# Patient Record
Sex: MALE | Race: WHITE
[De-identification: names, ages, dates, MRNs, and addresses within clinical notes are randomized per-mention and may not be internally consistent; named-entity substitution may affect disease eponyms.]

---

## 2019-05-21 ENCOUNTER — HOSPITAL ENCOUNTER (INPATIENT)
Dept: HOSPITAL 36 - GERO | Age: 59
LOS: 20 days | Discharge: NURSING, REHAB OR LONG-TERM CARE | DRG: 885 | End: 2019-06-10
Attending: PSYCHIATRY & NEUROLOGY | Admitting: PSYCHIATRY & NEUROLOGY
Payer: MEDICARE

## 2019-05-21 VITALS — DIASTOLIC BLOOD PRESSURE: 76 MMHG | SYSTOLIC BLOOD PRESSURE: 117 MMHG

## 2019-05-21 DIAGNOSIS — N28.9: ICD-10-CM

## 2019-05-21 DIAGNOSIS — E86.0: ICD-10-CM

## 2019-05-21 DIAGNOSIS — E44.0: ICD-10-CM

## 2019-05-21 DIAGNOSIS — Z59.0: ICD-10-CM

## 2019-05-21 DIAGNOSIS — F20.9: Primary | ICD-10-CM

## 2019-05-21 DIAGNOSIS — D64.9: ICD-10-CM

## 2019-05-21 DIAGNOSIS — E46: ICD-10-CM

## 2019-05-21 PROCEDURE — G0410 GRP PSYCH PARTIAL HOSP 45-50: HCPCS

## 2019-05-21 SDOH — ECONOMIC STABILITY - HOUSING INSECURITY: HOMELESSNESS: Z59.0

## 2019-05-22 LAB
CHOLEST SERPL-MCNC: 115 MG/DL (ref ?–200)
HDLC SERPL-MCNC: 36 MG/DL (ref 23–92)
TRIGL SERPL-MCNC: 53 MG/DL (ref ?–150)

## 2019-05-22 RX ADMIN — HALOPERIDOL LACTATE SCH MG: 2 SOLUTION, CONCENTRATE ORAL at 16:27

## 2019-05-23 LAB — HBA1C BLD-MCNC: 6 % (ref 4.8–5.6)

## 2019-05-23 RX ADMIN — HALOPERIDOL LACTATE SCH MG: 2 SOLUTION, CONCENTRATE ORAL at 16:53

## 2019-05-23 RX ADMIN — HALOPERIDOL LACTATE SCH MG: 2 SOLUTION, CONCENTRATE ORAL at 09:07

## 2019-05-23 NOTE — INTERNAL MEDICINE PROG NOTE
Internal Medicine Subjective





- Subjective


Patient seen and examined:: with staff, chart reviewed


Patient is:: awake, verbal, interactive, confused


Per staff patient has:: no adverse event, poor appetite





Internal Medicine Objective





- Results


Recent Labs: 


 Laboratory Last Values











Triglycerides  53 mg/dL (<150)   19  06:10    


 


Cholesterol  115 mg/dL (<200)   19  06:10    


 


LDL Cholesterol Direct  61 mg/dL ()  L  19  06:10    


 


HDL Cholesterol  36 mg/dL (23-92)   19  06:10    














- Physical Exam


Vitals and I&O: 


 Vital Signs











Temp  97.2 F   19 06:34


 


Pulse  64   19 06:34


 


Resp  18   19 06:34


 


BP  136/77   19 06:34


 


Pulse Ox  97   19 06:34








 Intake & Output











 19





 18:59 06:59 18:59


 


Intake Total 650 120 


 


Balance 650 120 


 


Intake:   


 


  Oral 650 120 


 


Other:   


 


  # Voids 2 3 











Active Medications: 


Current Medications





Acetaminophen (Tylenol)  650 mg PO Q4HR PRN


   PRN Reason: Mild Pain / Temp above 100


   Stop: 19 00:31


Al Hydrox/Mg Hydrox/Simethicone (Maalox)  30 ml PO Q4HR PRN


   PRN Reason: GI DISTRESS


   Stop: 19 00:31


Ascorbic Acid (Vitamin C)  500 mg PO DAILY GABY


   Stop: 19 08:59


   Last Admin: 19 09:06 Dose:  500 mg


Haloperidol Lactate (Haldol)  2 mg PO BID GABY; Protocol


   Stop: 19 16:59


   Last Admin: 19 09:07 Dose:  2 mg


Lithium Carbonate (Eskalith)  150 mg PO DAILY GABY; Protocol


   Stop: 19 08:59


   Last Admin: 19 09:06 Dose:  150 mg


Lorazepam (Ativan)  0.5 mg PO Q4HR PRN; Protocol


   PRN Reason: Anxiety


   Stop: 19 00:49


Magnesium Hydroxide (Milk Of Magnesia)  30 ml PO HS PRN


   PRN Reason: Constipation


Multivitamins/Vitamin C (Theragran)  1 tab PO DAILY GBAY


   Stop: 19 08:59


   Last Admin: 19 09:06 Dose:  1 tab


Zolpidem Tartrate (Ambien)  5 mg PO HS PRN


   PRN Reason: Insomnia


   Stop: 19 00:49


   Last Admin: 19 20:54 Dose:  5 mg








General: demented


HEENT: NC/AT, PERRLA, EOMI


Neck: Supple, No JVD


Lungs: CTAB


Cardiovascular: RRR, Normal S1, Normal S2


Abdomen: soft, non-tender, positive bowel sound


Extremities: excoriation


Neurological: no change





Internal Medicine Assmt/Plan





- Assessment


Assessment: 





ASSESSMENT AND PLAN:  Dehydration, malnutrition, anemia, renal insufficiency. 


generalized weakness








- Plan


Plan: 





PLAN:  


We will provide the patient adequate nutritional support.  


will provide hydration, supplements, sypmtomatic care and treatment.  


We will continue to follow.








Nutritional Asmnt/Malnutr-PDOC





- Dietary Evaluation


Malnutrition Findings (Please click <Entered> for more info): 








Nutritional Asmnt/Malnutrition                             Start:  19 14:

01


Text:                                                      Status: Complete    

  


Freq:                                                                          

  


Protocol:                                                                      

  


 Document     19 14:02  LCHENG  (Rec: 19 14:02  LCHENG  [id number])


 Nutritional Asmnt/Malnutrition


     Patient General Information


      Nutritional Screening                      High Risk


                                                 Consult


      Diagnosis                                  psychosis


      Pertinent Medical Hx/Surgical Hx           no H&P as of this time


      Subjective Information                     Consult received for poor


                                                 appetite. But SHRUTHI Rahman


                                                 reported pt ate well, consumed


                                                 75% breakfast this morning.


                                                 Pt requested strawberry Ensure


                                                 .


      Current Diet Order/ Nutrition Support      regular


      Pertinent Medications                      vit C, theragran


      Pertinent Labs                              reviewed


     Nutritional Hx/Data


      Height                                     1.78 m


      Height (Calculated Centimeters)            177.8


      Current Weight (lbs)                       54.431 kg


      Weight (Calculated Kilograms)              54.4


      Weight (Calculated Grams)                  07256.1


      Ideal Body Weight                          166


      Body Mass Index (BMI)                      17.2


      Weight Status                              Underweight


     GI Symptoms


      GI Symptoms                                None


      Last BM                                    not indicated


      Difficult in:                              None


      Skin Integrity/Comment:                    pressure area to reddened


                                                 sacrume, abrasion to lower


                                                 left extremity


      Current %PO                                Good (%)


     Estimated Nutritional Goals


      BEE in Kcals:                              Using Current wt


      Calories/Kcals/Kg                          27-32


      Kcals Calculated                           2762-1032


      Protein:                                   Using Current wt


      Protein g/k-1.2


      Protein Calculated                         55-66


      Fluid: ml                                  1485-1760ml (1ml/kcal)


     Nutritional Problem


      1. Problem


       Problem                                   underweight


       Etiology                                  possible inadequate energy


                                                 intake


       Signs/Symptoms:                           BMI 17.2


     Intervention/Recommendation


      Comments                                   1. Continue with regular diet


                                                 as ordered. Add Ensure BID (


                                                 strawberry flavor).


                                                 2. Monitor PO intake, wt, labs


                                                 and skin integrity


                                                 3. F/U as low risk in 7 days


     Expected Outcomes/Goals


      Expected Outcomes/Goals                    1. PO intake to meet at least


                                                 75% of nutritional needs.


                                                 2. Wt stability, skin to


                                                 remain intact, labs to


                                                 approach WNL.

## 2019-05-23 NOTE — HISTORY & PHYSICAL
ADMIT DATE:  05/21/2019



REASON FOR CONSULTATION:  ____ medical management at [place].  The patient was

admitted to inpatient unit.



HISTORY OF PRESENT ILLNESS:  This is an unfortunate male, currently homeless and

initially admitted at [place] to [facility], the patient was transferred

for continued care and treatment.  The patient denies chest pain, shortness of

breath.



PAST MEDICAL HISTORY:  As mentioned in the history present illness.



PAST SURGICAL HISTORY:  Denies surgeries in the past.



ALLERGIES:  No known drug allergies.



MEDICATIONS:  The patient is on multivitamins.  Denies diabetes, currently now

on lithium as well as Ativan.



FAMILY HISTORY:  Noncontributory.



SOCIAL HISTORY:  The patient smokes and drinks occasionally.  Homeless.  Did

some construction, is single.



REVIEW OF SYSTEMS:

GENERAL:  Not complaining of any fever or chills.  The patient is weak, having

difficulty walking.

LUNGS:  Negative COPD or asthma, chronic smoker.

HEART:  Denies hypertension or coronary artery disease.

ABDOMEN:  No nausea, vomiting, or pain.

GENITOURINARY:  The patient denies increased frequency or dysuria.

NEUROLOGIC:  No headache, seizure or syncope.

PSYCHIATRIC:  As above.



PHYSICAL EXAMINATION:

VITAL SIGNS:  Blood pressure 170/73, respiratory rate 18, pulse 80, temperature

97.8 Elderly male, appears older.

NECK:  Supple.  No mass.

LUNGS:  Equal breath sounds, otherwise clear to auscultation.

HEART:  Regular rate and rhythm without appreciable murmurs.

ABDOMEN:  Soft, nontender.  Positive bowel sounds.

EXTREMITIES:  Without cyanosis.  Positive excoriation atrophy.

NEUROLOGIC:  Limited, moving all 4 extremities.  Gait not seen.



LABORATORY DATA:  Triglycerides 53, cholesterol 115, LDL 61, HDL 36.  ____

hemoglobin is 11.



ASSESSMENT AND PLAN:  Dehydration, malnutrition, anemia, renal insufficiency. 

We will provide the patient adequate nutritional support.  The patient ____. 

The patient may ____  We will continue to follow.





DD: 05/22/2019 12:30

DT: 05/22/2019 13:09

[facility]# [id number]  [id number]

## 2019-05-23 NOTE — PSYCHIATRIC EVALUATION
DATE OF SERVICE:  05/22/2019



HISTORY OF PRESENT ILLNESS:  A 59-year-old male sent over by this clinician from

[facility] on a 5150 hold for grave disability.  The patient

presented there catatonic, not talking, not eating, not taking medications.  He

did accept IV medications and was started on IV Ativan.  Some of his catatonic

symptoms improved, he was talking more and his p.o. intake remained very poor. 

He remained bizarre, psychotic appearing, ruminative and nonsensical, concerns

about his ability to function at a lower level of care, now transferred to

[facility] after medical clearance.



PAST PSYCHIATRIC HISTORY:  Likely schizophrenia or bipolar.



FAMILY HISTORY:  Unclear.



SOCIAL HISTORY:  Details unclear, but apparently he lives with family.



MEDICATIONS:  Noted.



MENTAL STATUS EXAMINATION:  Disheveled, unkempt, staring off into space at

times, not really answering any questions, heavily internally preoccupied,

thought blocking, unclear SI, unclear HI, unclear content of any psychotic

symptoms, poor insight, poor judgment.



PROVISIONAL DIAGNOSES:  Schizophrenia, rule out bipolar, rule out

schizoaffective.



MEDICAL:  Please see full H and P.



ESTIMATED LENGTH OF STAY:  7-10 days.



ASSESSMENT:  The patient impoverished, very poor p.o. intake, psychotic

appearing, minimally interactive, not stable for a lower level of care.



PLAN:  We will continue medications and make appropriate adjustments.



TREATMENT PLAN:  Includes group as well as milieu therapy.



CONDITIONS FOR DISCHARGE:  Improved mood, improved affect, better p.o. intake,

control of any psychotic symptoms, safe discharge plan, good psychiatric

followup.





DD: 05/22/2019 21:10

DT: 05/23/2019 12:06

[facility]# [id number]  [id number]

## 2019-05-24 RX ADMIN — HALOPERIDOL LACTATE SCH MG: 2 SOLUTION, CONCENTRATE ORAL at 10:14

## 2019-05-24 RX ADMIN — HALOPERIDOL LACTATE SCH MG: 2 SOLUTION, CONCENTRATE ORAL at 16:53

## 2019-05-24 NOTE — INTERNAL MEDICINE PROG NOTE
Internal Medicine Subjective





- Subjective


Patient seen and examined:: with staff, chart reviewed


Patient is:: awake, verbal, interactive, confused


Per staff patient has:: no adverse event, poor appetite





Internal Medicine Objective





- Results


Recent Labs: 


 Laboratory Last Values











Triglycerides  53 mg/dL (<150)   19  06:10    


 


Cholesterol  115 mg/dL (<200)   19  06:10    


 


LDL Cholesterol Direct  61 mg/dL ()  L  19  06:10    


 


HDL Cholesterol  36 mg/dL (23-92)   19  06:10    














- Physical Exam


Vitals and I&O: 


 Vital Signs











Temp  97.2 F   19 06:17


 


Pulse  64   19 06:17


 


Resp  19   19 06:17


 


BP  126/75   19 06:17


 


Pulse Ox  97   19 06:17








 Intake & Output











 19





 18:59 06:59 18:59


 


Intake Total 800 120 


 


Balance 800 120 


 


Intake:   


 


  Oral 800 120 


 


Other:   


 


  # Voids 3 2 


 


  # Bowel Movements 0  











Active Medications: 


Current Medications





Acetaminophen (Tylenol)  650 mg PO Q4HR PRN


   PRN Reason: Mild Pain / Temp above 100


   Stop: 19 00:31


Al Hydrox/Mg Hydrox/Simethicone (Maalox)  30 ml PO Q4HR PRN


   PRN Reason: GI DISTRESS


   Stop: 19 00:31


Ascorbic Acid (Vitamin C)  500 mg PO DAILY GABY


   Stop: 19 08:59


   Last Admin: 19 10:14 Dose:  500 mg


Haloperidol Lactate (Haldol)  3 mg PO BID GABY; Protocol


   Stop: 19 16:59


   Last Admin: 19 10:14 Dose:  3 mg


Lorazepam (Ativan)  0.5 mg PO Q4HR PRN; Protocol


   PRN Reason: Anxiety


   Stop: 19 00:49


Magnesium Hydroxide (Milk Of Magnesia)  30 ml PO HS PRN


   PRN Reason: Constipation


Multivitamins/Vitamin C (Theragran)  1 tab PO DAILY GABY


   Stop: 19 08:59


   Last Admin: 19 10:16 Dose:  1 tab


Zolpidem Tartrate (Ambien)  5 mg PO HS PRN


   PRN Reason: Insomnia


   Stop: 19 00:49


   Last Admin: 19 00:39 Dose:  5 mg








General: demented


HEENT: NC/AT, PERRLA, EOMI


Neck: Supple, No JVD


Lungs: CTAB


Cardiovascular: RRR, Normal S1, Normal S2


Abdomen: soft, non-tender, positive bowel sound


Extremities: excoriation


Neurological: no change





Internal Medicine Assmt/Plan





- Assessment


Assessment: 





ASSESSMENT AND PLAN:  Dehydration, malnutrition, anemia, renal insufficiency. 


generalized weakness








- Plan


Plan: 





PLAN:  


We will provide the patient adequate nutritional support.  


will provide hydration, supplements, sypmtomatic care and treatment.  


We will continue to follow.








Nutritional Asmnt/Malnutr-PDOC





- Dietary Evaluation


Malnutrition Findings (Please click <Entered> for more info): 








Nutritional Asmnt/Malnutrition                             Start:  19 14:

01


Text:                                                      Status: Complete    

  


Freq:                                                                          

  


Protocol:                                                                      

  


 Document     19 14:02  LCGABRIELG  (Rec: 19 14:02  NETOG  IGNACIO-FNS1)


 Nutritional Asmnt/Malnutrition


     Patient General Information


      Nutritional Screening                      High Risk


                                                 Consult


      Diagnosis                                  psychosis


      Pertinent Medical Hx/Surgical Hx           no H&P as of this time


      Subjective Information                     Consult received for poor


                                                 appetite. But SHRUTHI Rahman


                                                 reported pt ate well, consumed


                                                 75% breakfast this morning.


                                                 Pt requested strawberry Ensure


                                                 .


      Current Diet Order/ Nutrition Support      regular


      Pertinent Medications                      vit C, theragran


      Pertinent Labs                              reviewed


     Nutritional Hx/Data


      Height                                     1.78 m


      Height (Calculated Centimeters)            177.8


      Current Weight (lbs)                       54.431 kg


      Weight (Calculated Kilograms)              54.4


      Weight (Calculated Grams)                  46698.1


      Ideal Body Weight                          166


      Body Mass Index (BMI)                      17.2


      Weight Status                              Underweight


     GI Symptoms


      GI Symptoms                                None


      Last BM                                    not indicated


      Difficult in:                              None


      Skin Integrity/Comment:                    pressure area to reddened


                                                 sacrume, abrasion to lower


                                                 left extremity


      Current %PO                                Good (%)


     Estimated Nutritional Goals


      BEE in Kcals:                              Using Current wt


      Calories/Kcals/Kg                          27-32


      Kcals Calculated                           5865-8207


      Protein:                                   Using Current wt


      Protein g/k-1.2


      Protein Calculated                         55-66


      Fluid: ml                                  1485-1760ml (1ml/kcal)


     Nutritional Problem


      1. Problem


       Problem                                   underweight


       Etiology                                  possible inadequate energy


                                                 intake


       Signs/Symptoms:                           BMI 17.2


     Intervention/Recommendation


      Comments                                   1. Continue with regular diet


                                                 as ordered. Add Ensure BID (


                                                 strawberry flavor).


                                                 2. Monitor PO intake, wt, labs


                                                 and skin integrity


                                                 3. F/U as low risk in 7 days


     Expected Outcomes/Goals


      Expected Outcomes/Goals                    1. PO intake to meet at least


                                                 75% of nutritional needs.


                                                 2. Wt stability, skin to


                                                 remain intact, labs to


                                                 approach WNL.

## 2019-05-24 NOTE — PROGRESS NOTES
DATE:  05/23/2019



SUBJECTIVE:  The patient remains bizarre, delusional, stating the staff is

either coming out or stating staff is not feeding him and that he wants to eat

every day, but they are giving him food and feeding him.  He does not really

take much in and then is making allegations that he is not getting any food,

very confused, disoriented, bizarre, collateral from father noting that

apparently he was having some negative reaction to lithium in the past, details

unclear.  We will attempt to reach out to family to try to increase collateral. 

Ongoing concerns about his p.o. intake.  We will continue to monitor for now,

continue dosing of Haldol, stop lithium.  We will be increasing the dosing of

Haldol.  The patient is not catatonic, but he is pretty disorganized and

nonsensical.  Initiate a 14-day hold for grave disability.





DD: 05/23/2019 16:23

DT: 05/24/2019 00:02

JOB# [id number]  [id number]

## 2019-05-25 RX ADMIN — HALOPERIDOL LACTATE SCH MG: 2 SOLUTION, CONCENTRATE ORAL at 09:23

## 2019-05-25 RX ADMIN — HALOPERIDOL LACTATE SCH MG: 2 SOLUTION, CONCENTRATE ORAL at 17:05

## 2019-05-25 NOTE — PROGRESS NOTES
DATE:  



Covering for Dr. Tammy Che.



Case was discussed with staff of the patient, reviewed records.  The patient

apparently was referred by Dr. Che from [facility] on a hold

for gait disability.  The patient presented catatonic, not talking, not eating,

not taking medication with a history of schizophrenia versus bipolar unable to

participate in conversation or make safe plan for self-care.  The patient

continues to complain of food, though he had a tray of food.  When I talked to

him in front of him, though he yesterday was complaining about not getting

enough food.  He is still confused, disoriented, and bizarre.  Continues to be

unpredictable, impulsive, needing redirection.  He has been compliant with the

medication with no side effects and no sedation, no nausea.  He is on Haldol 3

mg twice a day that was increased yesterday.  He was ____, was discontinued and

we will continue outpatient group therapy, milieu therapy, adjust medication as

needed.





DD: 05/24/2019 13:27

DT: 05/25/2019 12:07

[facility]# [id number]  [id number]

## 2019-05-25 NOTE — PROGRESS NOTES
DATE:  



Dr. Brar covering for Dr. Che.



SUBJECTIVE:   Chart reviewed and the patient interviewed.  Also discussed the

patient's condition with the staff and reviewed records and labs.  The patient

is still restless and he is still easily agitated.  The patient also is

suspicious and is still paranoid.  He also needs lots of redirections.  The

patient also still had helped from staff for feeding him and giving him food. 

The patient also has been confused and disoriented and has been having a lot of

problems with his hygiene.  He also has a negative reaction to lithium in the

past and at this time, the patient is still monitored closely and still

adjusting the dose of Haldol and lithium was stopped.



Thought processes are disorganized and nonsensical.



ASSESSMENT:  The patient is still psychotic and needs close monitoring.



TREATMENT PLAN:  Continue to monitor his behavior and condition closely.  The

patient also continued to take Haldol in a dose of 3 mg twice a day and Ativan

on a p.r.n. basis.  Also, continue to work on his confusion, irritability, and

bizarre behavior, and continue to follow up.





DD: 05/25/2019 16:19

DT: 05/25/2019 19:56

JOB# [id number]  [id number]

## 2019-05-26 RX ADMIN — HALOPERIDOL LACTATE SCH MG: 2 SOLUTION, CONCENTRATE ORAL at 17:14

## 2019-05-26 RX ADMIN — HALOPERIDOL LACTATE SCH MG: 2 SOLUTION, CONCENTRATE ORAL at 08:50

## 2019-05-26 NOTE — INTERNAL MEDICINE PROG NOTE
Internal Medicine Subjective





- Subjective


Patient seen and examined:: with staff, chart reviewed


Patient is:: awake, verbal, interactive, confused


Per staff patient has:: no adverse event, poor appetite





Internal Medicine Objective





- Results


Recent Labs: 


 Laboratory Last Values











Triglycerides  53 mg/dL (<150)   19  06:10    


 


Cholesterol  115 mg/dL (<200)   19  06:10    


 


LDL Cholesterol Direct  61 mg/dL ()  L  19  06:10    


 


HDL Cholesterol  36 mg/dL (23-92)   19  06:10    














- Physical Exam


Vitals and I&O: 


 Vital Signs











Temp  98.0 F   19 14:00


 


Pulse  94   19 14:00


 


Resp  20   19 14:00


 


BP  112/70   19 14:00


 


Pulse Ox  97   19 14:00








 Intake & Output











 19





 18:59 06:59 18:59


 


Intake Total 950 360 


 


Balance 950 360 


 


Intake:   


 


  Oral 950 360 


 


Other:   


 


  # Voids 4 3 


 


  # Bowel Movements 1 1 











Active Medications: 


Current Medications





Acetaminophen (Tylenol)  650 mg PO Q4HR PRN


   PRN Reason: Mild Pain / Temp above 100


   Stop: 19 00:31


Al Hydrox/Mg Hydrox/Simethicone (Maalox)  30 ml PO Q4HR PRN


   PRN Reason: GI DISTRESS


   Stop: 19 00:31


Ascorbic Acid (Vitamin C)  500 mg PO DAILY GABY


   Stop: 19 08:59


   Last Admin: 19 08:50 Dose:  500 mg


Haloperidol Lactate (Haldol)  5 mg PO BID GABY; Protocol


   Stop: 19 08:59


   Last Admin: 19 08:50 Dose:  5 mg


Lorazepam (Ativan)  0.5 mg PO Q4HR PRN; Protocol


   PRN Reason: Anxiety


   Stop: 19 00:49


   Last Admin: 19 12:56 Dose:  0.5 mg


Magnesium Hydroxide (Milk Of Magnesia)  30 ml PO HS PRN


   PRN Reason: Constipation


Multivitamins/Vitamin C (Theragran)  1 tab PO DAILY GABY


   Stop: 19 08:59


   Last Admin: 19 08:50 Dose:  1 tab


Trazodone HCl (Desyrel)  50 mg PO HS GABY; Protocol


   Stop: 19 20:59


Zolpidem Tartrate (Ambien)  5 mg PO HS PRN


   PRN Reason: Insomnia


   Stop: 19 00:49


   Last Admin: 19 21:24 Dose:  5 mg








General: demented


HEENT: NC/AT, PERRLA, EOMI


Neck: Supple, No JVD


Lungs: CTAB


Cardiovascular: RRR, Normal S1, Normal S2


Abdomen: soft, non-tender, positive bowel sound


Extremities: excoriation


Neurological: no change





Internal Medicine Assmt/Plan





- Assessment


Assessment: 





ASSESSMENT AND PLAN:  Dehydration, malnutrition, anemia, renal insufficiency. 


generalized weakness








- Plan


Plan: 





PLAN:  


We will provide the patient adequate nutritional support.  


will provide hydration, supplements, sypmtomatic care and treatment.  


We will continue to follow.








Nutritional Asmnt/Malnutr-PDOC





- Dietary Evaluation


Malnutrition Findings (Please click <Entered> for more info): 








Nutritional Asmnt/Malnutrition                             Start:  19 14:

01


Text:                                                      Status: Complete    

  


Freq:                                                                          

  


Protocol:                                                                      

  


 Document     19 14:02  LCHENG  (Rec: 19 14:02  LCHENG  [id number])


 Nutritional Asmnt/Malnutrition


     Patient General Information


      Nutritional Screening                      High Risk


                                                 Consult


      Diagnosis                                  psychosis


      Pertinent Medical Hx/Surgical Hx           no H&P as of this time


      Subjective Information                     Consult received for poor


                                                 appetite. But SHRUTHI Rahman


                                                 reported pt ate well, consumed


                                                 75% breakfast this morning.


                                                 Pt requested strawberry Ensure


                                                 .


      Current Diet Order/ Nutrition Support      regular


      Pertinent Medications                      vit C, theragran


      Pertinent Labs                              reviewed


     Nutritional Hx/Data


      Height                                     1.78 m


      Height (Calculated Centimeters)            177.8


      Current Weight (lbs)                       54.431 kg


      Weight (Calculated Kilograms)              54.4


      Weight (Calculated Grams)                  37186.1


      Ideal Body Weight                          166


      Body Mass Index (BMI)                      17.2


      Weight Status                              Underweight


     GI Symptoms


      GI Symptoms                                None


      Last BM                                    not indicated


      Difficult in:                              None


      Skin Integrity/Comment:                    pressure area to reddened


                                                 sacrume, abrasion to lower


                                                 left extremity


      Current %PO                                Good (%)


     Estimated Nutritional Goals


      BEE in Kcals:                              Using Current wt


      Calories/Kcals/Kg                          27-32


      Kcals Calculated                           0573-0870


      Protein:                                   Using Current wt


      Protein g/k-1.2


      Protein Calculated                         55-66


      Fluid: ml                                  1485-1760ml (1ml/kcal)


     Nutritional Problem


      1. Problem


       Problem                                   underweight


       Etiology                                  possible inadequate energy


                                                 intake


       Signs/Symptoms:                           BMI 17.2


     Intervention/Recommendation


      Comments                                   1. Continue with regular diet


                                                 as ordered. Add Ensure BID (


                                                 strawberry flavor).


                                                 2. Monitor PO intake, wt, labs


                                                 and skin integrity


                                                 3. F/U as low risk in 7 days


     Expected Outcomes/Goals


      Expected Outcomes/Goals                    1. PO intake to meet at least


                                                 75% of nutritional needs.


                                                 2. Wt stability, skin to


                                                 remain intact, labs to


                                                 approach WNL.

## 2019-05-27 RX ADMIN — HALOPERIDOL LACTATE SCH MG: 2 SOLUTION, CONCENTRATE ORAL at 17:06

## 2019-05-27 RX ADMIN — HALOPERIDOL LACTATE SCH MG: 2 SOLUTION, CONCENTRATE ORAL at 08:46

## 2019-05-27 NOTE — PROGRESS NOTES
DATE:  05/26/2019



SUBJECTIVE:  Chart reviewed and the patient interviewed.  Also discussed the

patient's condition with the staff and reviewed records and labs.  The patient

seems to be more agitated and irritable this morning and the patient was walking

in the hallway taking his shirt off and walking topless.  He also had difficult

time following staff directions and he did not want to put his shirt on.  Also,

the patient has been easily agitated and did not sleep much.  Also, he has been

eating a lot according to staff and has been in more angry and irritable mood as

well as hyperverbal.  Also, he has been talking making no sense.



ASSESSMENT:  The patient is still psychotic and agitated.



TREATMENT PLAN:  Continue to monitor his behavior and condition closely.  Also,

we will increase Haldol to 5 mg twice a day and we will add trazodone 100 mg at

bedtime and we will continue to follow up closely.





DD: 05/26/2019 21:19

DT: 05/27/2019 09:55

JOB# [id number]  [id number]

## 2019-05-27 NOTE — PROGRESS NOTES
DATE:  



PSYCHIATRIC PROGRESS NOTE



SUBJECTIVE:  Chart reviewed and the patient interviewed.  Also discussed the

patient's condition with the staff and reviewed records and labs.  The patient

is still agitated and is still awake most of the night, but seems to be slightly

calmer and is slightly easier to redirect him.  The patient also slept slightly

better than the night before since started on trazodone.  The patient also still

has disorganized thoughts and hyperverbal at times.  Otherwise, the patient is

compliant with taking medications and cooperative with his treatment.



ASSESSMENT:  The patient is still agitated and psychotic.



TREATMENT PLAN:  Continue to monitor behavior and condition closely.  Also,

continue adjusting psychotropic medications.





DD: 05/27/2019 21:56

DT: 05/27/2019 22:47

JOB# [id number]  [id number]

## 2019-05-27 NOTE — INTERNAL MEDICINE PROG NOTE
Internal Medicine Subjective





- Subjective


Patient seen and examined:: with staff, chart reviewed


Patient is:: awake, verbal, interactive, confused


Per staff patient has:: no adverse event, poor appetite





Internal Medicine Objective





- Results


Recent Labs: 


 Laboratory Last Values











Triglycerides  53 mg/dL (<150)   19  06:10    


 


Cholesterol  115 mg/dL (<200)   19  06:10    


 


LDL Cholesterol Direct  61 mg/dL ()  L  19  06:10    


 


HDL Cholesterol  36 mg/dL (23-92)   19  06:10    














- Physical Exam


Vitals and I&O: 


 Vital Signs











Temp  98.4 F   19 06:03


 


Pulse  71   19 06:03


 


Resp  20   19 06:03


 


BP  123/71   19 06:03


 


Pulse Ox  98   19 06:03








 Intake & Output











 19





 18:59 06:59 18:59


 


Intake Total  120 


 


Balance  120 


 


Intake:   


 


  Oral  120 


 


Other:   


 


  # Voids  3 


 


  # Bowel Movements  0 











Active Medications: 


Current Medications





Acetaminophen (Tylenol)  650 mg PO Q4HR PRN


   PRN Reason: Mild Pain / Temp above 100


   Stop: 19 00:31


Al Hydrox/Mg Hydrox/Simethicone (Maalox)  30 ml PO Q4HR PRN


   PRN Reason: GI DISTRESS


   Stop: 19 00:31


Ascorbic Acid (Vitamin C)  500 mg PO DAILY GABY


   Stop: 19 08:59


   Last Admin: 19 08:46 Dose:  500 mg


Haloperidol Lactate (Haldol)  5 mg PO BID GABY; Protocol


   Stop: 19 08:59


   Last Admin: 19 08:46 Dose:  5 mg


Lorazepam (Ativan)  0.5 mg PO Q4HR PRN; Protocol


   PRN Reason: Anxiety


   Stop: 19 00:49


   Last Admin: 19 17:15 Dose:  0.5 mg


Magnesium Hydroxide (Milk Of Magnesia)  30 ml PO HS PRN


   PRN Reason: Constipation


Multivitamins/Vitamin C (Theragran)  1 tab PO DAILY GABY


   Stop: 19 08:59


   Last Admin: 19 08:46 Dose:  1 tab


Trazodone HCl (Desyrel)  50 mg PO HS GABY; Protocol


   Stop: 19 20:59


   Last Admin: 19 21:03 Dose:  50 mg


Zolpidem Tartrate (Ambien)  5 mg PO HS PRN


   PRN Reason: Insomnia


   Stop: 19 00:49


   Last Admin: 19 21:03 Dose:  5 mg








General: demented


HEENT: NC/AT, PERRLA, EOMI


Neck: Supple, No JVD


Lungs: CTAB


Cardiovascular: RRR, Normal S1, Normal S2


Abdomen: soft, non-tender, positive bowel sound


Extremities: excoriation


Neurological: no change





Internal Medicine Assmt/Plan





- Assessment


Assessment: 





ASSESSMENT AND PLAN:  Dehydration, malnutrition, anemia, renal insufficiency. 


generalized weakness








- Plan


Plan: 





PLAN:  


We will provide the patient adequate nutritional support.  


will provide hydration, supplements, sypmtomatic care and treatment.  


We will continue to follow.








Nutritional Asmnt/Malnutr-PDOC





- Dietary Evaluation


Malnutrition Findings (Please click <Entered> for more info): 








Nutritional Asmnt/Malnutrition                             Start:  19 14:

01


Text:                                                      Status: Complete    

  


Freq:                                                                          

  


Protocol:                                                                      

  


 Document     19 14:02  LCHENG  (Rec: 19 14:02  LCHENG  [id number])


 Nutritional Asmnt/Malnutrition


     Patient General Information


      Nutritional Screening                      High Risk


                                                 Consult


      Diagnosis                                  psychosis


      Pertinent Medical Hx/Surgical Hx           no H&P as of this time


      Subjective Information                     Consult received for poor


                                                 appetite. But SHRUTHI Rahman


                                                 reported pt ate well, consumed


                                                 75% breakfast this morning.


                                                 Pt requested strawberry Ensure


                                                 .


      Current Diet Order/ Nutrition Support      regular


      Pertinent Medications                      vit C, theragran


      Pertinent Labs                              reviewed


     Nutritional Hx/Data


      Height                                     1.78 m


      Height (Calculated Centimeters)            177.8


      Current Weight (lbs)                       54.431 kg


      Weight (Calculated Kilograms)              54.4


      Weight (Calculated Grams)                  56555.1


      Ideal Body Weight                          166


      Body Mass Index (BMI)                      17.2


      Weight Status                              Underweight


     GI Symptoms


      GI Symptoms                                None


      Last BM                                    not indicated


      Difficult in:                              None


      Skin Integrity/Comment:                    pressure area to reddened


                                                 sacrume, abrasion to lower


                                                 left extremity


      Current %PO                                Good (%)


     Estimated Nutritional Goals


      BEE in Kcals:                              Using Current wt


      Calories/Kcals/Kg                          27-32


      Kcals Calculated                           2651-3550


      Protein:                                   Using Current wt


      Protein g/k-1.2


      Protein Calculated                         55-66


      Fluid: ml                                  1485-1760ml (1ml/kcal)


     Nutritional Problem


      1. Problem


       Problem                                   underweight


       Etiology                                  possible inadequate energy


                                                 intake


       Signs/Symptoms:                           BMI 17.2


     Intervention/Recommendation


      Comments                                   1. Continue with regular diet


                                                 as ordered. Add Ensure BID (


                                                 strawberry flavor).


                                                 2. Monitor PO intake, wt, labs


                                                 and skin integrity


                                                 3. F/U as low risk in 7 days


     Expected Outcomes/Goals


      Expected Outcomes/Goals                    1. PO intake to meet at least


                                                 75% of nutritional needs.


                                                 2. Wt stability, skin to


                                                 remain intact, labs to


                                                 approach WNL.

## 2019-05-28 RX ADMIN — HALOPERIDOL LACTATE SCH MG: 2 SOLUTION, CONCENTRATE ORAL at 08:37

## 2019-05-28 RX ADMIN — HALOPERIDOL LACTATE SCH MG: 2 SOLUTION, CONCENTRATE ORAL at 16:52

## 2019-05-28 NOTE — INTERNAL MEDICINE PROG NOTE
Internal Medicine Subjective





- Subjective


Patient seen and examined:: with staff, chart reviewed


Patient is:: awake, verbal, interactive, confused


Per staff patient has:: no adverse event, poor appetite





Internal Medicine Objective





- Results


Recent Labs: 


 Laboratory Last Values











Triglycerides  53 mg/dL (<150)   19  06:10    


 


Cholesterol  115 mg/dL (<200)   19  06:10    


 


LDL Cholesterol Direct  61 mg/dL ()  L  19  06:10    


 


HDL Cholesterol  36 mg/dL (23-92)   19  06:10    














- Physical Exam


Vitals and I&O: 


 Vital Signs











Temp  98.1 F   19 06:11


 


Pulse  77   19 06:11


 


Resp  18   19 06:11


 


BP  136/80   19 06:11


 


Pulse Ox  99   19 06:11








 Intake & Output











 19





 18:59 06:59 18:59


 


Intake Total 950 240 


 


Balance 950 240 


 


Intake:   


 


  Oral 950 240 


 


Other:   


 


  # Voids 3 3 


 


  # Bowel Movements 0 0 











Active Medications: 


Current Medications





Acetaminophen (Tylenol)  650 mg PO Q4HR PRN


   PRN Reason: Mild Pain / Temp above 100


   Stop: 19 00:31


Al Hydrox/Mg Hydrox/Simethicone (Maalox)  30 ml PO Q4HR PRN


   PRN Reason: GI DISTRESS


   Stop: 19 00:31


Ascorbic Acid (Vitamin C)  500 mg PO DAILY GABY


   Stop: 19 08:59


   Last Admin: 19 08:44 Dose:  Not Given


Haloperidol Lactate (Haldol)  5 mg PO BID GABY; Protocol


   Stop: 19 08:59


   Last Admin: 19 08:37 Dose:  5 mg


Lorazepam (Ativan)  0.5 mg PO Q4HR PRN; Protocol


   PRN Reason: Anxiety


   Stop: 19 00:49


   Last Admin: 19 17:15 Dose:  0.5 mg


Magnesium Hydroxide (Milk Of Magnesia)  30 ml PO HS PRN


   PRN Reason: Constipation


Multivitamins/Vitamin C (Theragran)  1 tab PO DAILY GABY


   Stop: 19 08:59


   Last Admin: 19 08:44 Dose:  Not Given


Trazodone HCl (Desyrel)  50 mg PO HS GABY; Protocol


   Stop: 19 20:59


   Last Admin: 19 21:16 Dose:  50 mg


Zolpidem Tartrate (Ambien)  5 mg PO HS PRN


   PRN Reason: Insomnia


   Stop: 19 00:49


   Last Admin: 19 21:16 Dose:  5 mg








General: demented


HEENT: NC/AT, PERRLA, EOMI


Neck: Supple, No JVD


Lungs: CTAB


Cardiovascular: RRR, Normal S1, Normal S2


Abdomen: soft, non-tender, positive bowel sound


Extremities: excoriation


Neurological: no change





Internal Medicine Assmt/Plan





- Assessment


Assessment: 





ASSESSMENT AND PLAN:  Dehydration, malnutrition, anemia, renal insufficiency. 


generalized weakness








- Plan


Plan: 





PLAN:  


We will provide the patient adequate nutritional support.  


will provide hydration, supplements, sypmtomatic care and treatment.  


We will continue to follow.








Nutritional Asmnt/Malnutr-PDOC





- Dietary Evaluation


Malnutrition Findings (Please click <Entered> for more info): 








Nutritional Asmnt/Malnutrition                             Start:  19 14:

01


Text:                                                      Status: Complete    

  


Freq:                                                                          

  


Protocol:                                                                      

  


 Document     19 14:02  LCHENG  (Rec: 19 14:02  LCHENG  [id number])


 Nutritional Asmnt/Malnutrition


     Patient General Information


      Nutritional Screening                      High Risk


                                                 Consult


      Diagnosis                                  psychosis


      Pertinent Medical Hx/Surgical Hx           no H&P as of this time


      Subjective Information                     Consult received for poor


                                                 appetite. But SHRUTHI Rahman


                                                 reported pt ate well, consumed


                                                 75% breakfast this morning.


                                                 Pt requested strawberry Ensure


                                                 .


      Current Diet Order/ Nutrition Support      regular


      Pertinent Medications                      vit C, theragran


      Pertinent Labs                              reviewed


     Nutritional Hx/Data


      Height                                     1.78 m


      Height (Calculated Centimeters)            177.8


      Current Weight (lbs)                       54.431 kg


      Weight (Calculated Kilograms)              54.4


      Weight (Calculated Grams)                  47085.1


      Ideal Body Weight                          166


      Body Mass Index (BMI)                      17.2


      Weight Status                              Underweight


     GI Symptoms


      GI Symptoms                                None


      Last BM                                    not indicated


      Difficult in:                              None


      Skin Integrity/Comment:                    pressure area to reddened


                                                 sacrume, abrasion to lower


                                                 left extremity


      Current %PO                                Good (%)


     Estimated Nutritional Goals


      BEE in Kcals:                              Using Current wt


      Calories/Kcals/Kg                          27-32


      Kcals Calculated                           2497-4123


      Protein:                                   Using Current wt


      Protein g/k-1.2


      Protein Calculated                         55-66


      Fluid: ml                                  1485-1760ml (1ml/kcal)


     Nutritional Problem


      1. Problem


       Problem                                   underweight


       Etiology                                  possible inadequate energy


                                                 intake


       Signs/Symptoms:                           BMI 17.2


     Intervention/Recommendation


      Comments                                   1. Continue with regular diet


                                                 as ordered. Add Ensure BID (


                                                 strawberry flavor).


                                                 2. Monitor PO intake, wt, labs


                                                 and skin integrity


                                                 3. F/U as low risk in 7 days


     Expected Outcomes/Goals


      Expected Outcomes/Goals                    1. PO intake to meet at least


                                                 75% of nutritional needs.


                                                 2. Wt stability, skin to


                                                 remain intact, labs to


                                                 approach WNL.

## 2019-05-29 RX ADMIN — HALOPERIDOL LACTATE SCH MG: 2 SOLUTION, CONCENTRATE ORAL at 09:48

## 2019-05-29 RX ADMIN — HALOPERIDOL LACTATE SCH MG: 2 SOLUTION, CONCENTRATE ORAL at 17:21

## 2019-05-29 NOTE — PROGRESS NOTES
DATE:  05/28/2019



FOLLOWUP PROGRESS NOTE



PROGRESS ON THE UNIT:  Case discussed with staff of the patient, reviewed

records.  The patient continues to be unpredictable, impulsive, internally

preoccupied, unable to express himself clearly.  He continues to need

redirection.  He is sleeping better and eating better.  He continues to have

episodes of agitation and psychosis.  No side effects to the medication, no

sedation, no nausea, no extrapyramidal symptoms.  We will continue to work with

the patient in group therapy and milieu therapy, adjust the medications as

needed.





DD: 05/28/2019 12:12

DT: 05/29/2019 02:34

JOB# [id number]  [id number]

## 2019-05-29 NOTE — INTERNAL MEDICINE PROG NOTE
Internal Medicine Subjective





- Subjective


Patient seen and examined:: with staff, chart reviewed


Patient is:: awake, verbal, interactive, confused


Per staff patient has:: no adverse event, poor appetite





Internal Medicine Objective





- Results


Recent Labs: 


 Laboratory Last Values











Triglycerides  53 mg/dL (<150)   19  06:10    


 


Cholesterol  115 mg/dL (<200)   19  06:10    


 


LDL Cholesterol Direct  61 mg/dL ()  L  19  06:10    


 


HDL Cholesterol  36 mg/dL (23-92)   19  06:10    














- Physical Exam


Vitals and I&O: 


 Vital Signs











Temp  97.9 F   19 04:59


 


Pulse  77   19 04:59


 


Resp  19   19 04:59


 


BP  140/90   19 04:59


 


Pulse Ox  98   19 04:59








 Intake & Output











 19





 18:59 06:59 18:59


 


Intake Total  480 


 


Balance  480 


 


Intake:   


 


  Oral  480 


 


Other:   


 


  # Voids  2 











Active Medications: 


Current Medications





Acetaminophen (Tylenol)  650 mg PO Q4HR PRN


   PRN Reason: Mild Pain / Temp above 100


   Stop: 19 00:31


Al Hydrox/Mg Hydrox/Simethicone (Maalox)  30 ml PO Q4HR PRN


   PRN Reason: GI DISTRESS


   Stop: 19 00:31


Ascorbic Acid (Vitamin C)  500 mg PO DAILY GABY


   Stop: 19 08:59


   Last Admin: 19 09:47 Dose:  500 mg


Haloperidol Lactate (Haldol)  5 mg PO BID GABY; Protocol


   Stop: 19 08:59


   Last Admin: 19 09:48 Dose:  5 mg


Lorazepam (Ativan)  0.5 mg PO Q4HR PRN; Protocol


   PRN Reason: Anxiety


   Stop: 19 00:49


   Last Admin: 19 17:15 Dose:  0.5 mg


Magnesium Hydroxide (Milk Of Magnesia)  30 ml PO HS PRN


   PRN Reason: Constipation


Multivitamins/Vitamin C (Theragran)  1 tab PO DAILY GABY


   Stop: 19 08:59


   Last Admin: 19 09:48 Dose:  1 tab


Trazodone HCl (Desyrel)  50 mg PO HS GABY; Protocol


   Stop: 19 20:59


   Last Admin: 19 20:37 Dose:  50 mg


Zolpidem Tartrate (Ambien)  5 mg PO HS PRN


   PRN Reason: Insomnia


   Stop: 19 00:49


   Last Admin: 19 21:16 Dose:  5 mg








General: demented


HEENT: NC/AT, PERRLA, EOMI


Neck: Supple, No JVD


Lungs: CTAB


Cardiovascular: RRR, Normal S1, Normal S2


Abdomen: soft, non-tender, positive bowel sound


Extremities: excoriation


Neurological: no change





Internal Medicine Assmt/Plan





- Assessment


Assessment: 





ASSESSMENT AND PLAN:  Dehydration, malnutrition, anemia, renal insufficiency. 


generalized weakness








- Plan


Plan: 





PLAN:  


We will provide the patient adequate nutritional support.  


will provide hydration, supplements, sypmtomatic care and treatment.  


We will continue to follow.








Nutritional Asmnt/Malnutr-PDOC





- Dietary Evaluation


Malnutrition Findings (Please click <Entered> for more info): 








Nutritional Asmnt/Malnutrition                             Start:  19 14:

01


Text:                                                      Status: Complete    

  


Freq:                                                                          

  


Protocol:                                                                      

  


 Document     19 14:02  LCHENG  (Rec: 19 14:02  LCHENG  [id number])


 Nutritional Asmnt/Malnutrition


     Patient General Information


      Nutritional Screening                      High Risk


                                                 Consult


      Diagnosis                                  psychosis


      Pertinent Medical Hx/Surgical Hx           no H&P as of this time


      Subjective Information                     Consult received for poor


                                                 appetite. But SHRUTHI Rahman


                                                 reported pt ate well, consumed


                                                 75% breakfast this morning.


                                                 Pt requested strawberry Ensure


                                                 .


      Current Diet Order/ Nutrition Support      regular


      Pertinent Medications                      vit C, theragran


      Pertinent Labs                              reviewed


     Nutritional Hx/Data


      Height                                     1.78 m


      Height (Calculated Centimeters)            177.8


      Current Weight (lbs)                       54.431 kg


      Weight (Calculated Kilograms)              54.4


      Weight (Calculated Grams)                  91589.1


      Ideal Body Weight                          166


      Body Mass Index (BMI)                      17.2


      Weight Status                              Underweight


     GI Symptoms


      GI Symptoms                                None


      Last BM                                    not indicated


      Difficult in:                              None


      Skin Integrity/Comment:                    pressure area to reddened


                                                 sacrume, abrasion to lower


                                                 left extremity


      Current %PO                                Good (%)


     Estimated Nutritional Goals


      BEE in Kcals:                              Using Current wt


      Calories/Kcals/Kg                          27-32


      Kcals Calculated                           6730-7168


      Protein:                                   Using Current wt


      Protein g/k-1.2


      Protein Calculated                         55-66


      Fluid: ml                                  1485-1760ml (1ml/kcal)


     Nutritional Problem


      1. Problem


       Problem                                   underweight


       Etiology                                  possible inadequate energy


                                                 intake


       Signs/Symptoms:                           BMI 17.2


     Intervention/Recommendation


      Comments                                   1. Continue with regular diet


                                                 as ordered. Add Ensure BID (


                                                 strawberry flavor).


                                                 2. Monitor PO intake, wt, labs


                                                 and skin integrity


                                                 3. F/U as low risk in 7 days


     Expected Outcomes/Goals


      Expected Outcomes/Goals                    1. PO intake to meet at least


                                                 75% of nutritional needs.


                                                 2. Wt stability, skin to


                                                 remain intact, labs to


                                                 approach WNL.

## 2019-05-30 RX ADMIN — HALOPERIDOL LACTATE SCH MG: 2 SOLUTION, CONCENTRATE ORAL at 09:57

## 2019-05-30 RX ADMIN — HALOPERIDOL LACTATE SCH MG: 2 SOLUTION, CONCENTRATE ORAL at 18:14

## 2019-05-30 NOTE — INTERNAL MEDICINE PROG NOTE
Internal Medicine Subjective





- Subjective


Patient seen and examined:: with staff, chart reviewed


Patient is:: awake, verbal, interactive, confused


Per staff patient has:: no adverse event, poor appetite





Internal Medicine Objective





- Results


Recent Labs: 


 Laboratory Last Values











Triglycerides  53 mg/dL (<150)   19  06:10    


 


Cholesterol  115 mg/dL (<200)   19  06:10    


 


LDL Cholesterol Direct  61 mg/dL ()  L  19  06:10    


 


HDL Cholesterol  36 mg/dL (23-92)   19  06:10    














- Physical Exam


Vitals and I&O: 


 Vital Signs











Temp  98.6 F   19 14:00


 


Pulse  69   19 14:00


 


Resp  19   19 14:00


 


BP  134/82   19 14:00


 


Pulse Ox  100   19 14:00








 Intake & Output











 19





 18:59 06:59 18:59


 


Intake Total 1600  


 


Balance 1600  


 


Intake:   


 


  Oral 1600  


 


Other:   


 


  # Voids 4  


 


  # Bowel Movements 0  











Active Medications: 


Current Medications





Acetaminophen (Tylenol)  650 mg PO Q4HR PRN


   PRN Reason: Mild Pain / Temp above 100


   Stop: 19 00:31


Al Hydrox/Mg Hydrox/Simethicone (Maalox)  30 ml PO Q4HR PRN


   PRN Reason: GI DISTRESS


   Stop: 19 00:31


Ascorbic Acid (Vitamin C)  500 mg PO DAILY GABY


   Stop: 19 08:59


   Last Admin: 19 09:58 Dose:  500 mg


Haloperidol Lactate (Haldol)  5 mg PO BID GABY; Protocol


   Stop: 19 08:59


   Last Admin: 19 09:57 Dose:  5 mg


Lorazepam (Ativan)  0.5 mg PO Q4HR PRN; Protocol


   PRN Reason: Anxiety


   Stop: 19 00:49


   Last Admin: 19 17:15 Dose:  0.5 mg


Magnesium Hydroxide (Milk Of Magnesia)  30 ml PO HS PRN


   PRN Reason: Constipation


Multivitamins/Vitamin C (Theragran)  1 tab PO DAILY GABY


   Stop: 19 08:59


   Last Admin: 19 09:58 Dose:  1 tab


Trazodone HCl (Desyrel)  50 mg PO HS GABY; Protocol


   Stop: 19 20:59


   Last Admin: 19 20:12 Dose:  50 mg


Zolpidem Tartrate (Ambien)  5 mg PO HS PRN


   PRN Reason: Insomnia


   Stop: 19 00:49


   Last Admin: 19 21:16 Dose:  5 mg








General: demented


HEENT: NC/AT, PERRLA, EOMI


Neck: Supple, No JVD


Lungs: CTAB


Cardiovascular: RRR, Normal S1, Normal S2


Abdomen: soft, non-tender, positive bowel sound


Extremities: excoriation


Neurological: no change





Internal Medicine Assmt/Plan





- Assessment


Assessment: 





ASSESSMENT AND PLAN:  Dehydration, malnutrition, anemia, renal insufficiency. 


generalized weakness








- Plan


Plan: 





PLAN:  


We will provide the patient adequate nutritional support.  


will provide hydration, supplements, sypmtomatic care and treatment.  


We will continue to follow.








Nutritional Asmnt/Malnutr-PDOC





- Dietary Evaluation


Malnutrition Findings (Please click <Entered> for more info): 








Nutritional Asmnt/Malnutrition                             Start:  19 14:

01


Text:                                                      Status: Complete    

  


Freq:                                                                          

  


Protocol:                                                                      

  


 Document     19 14:02  LCHENG  (Rec: 19 14:02  LCHENG  [id number])


 Nutritional Asmnt/Malnutrition


     Patient General Information


      Nutritional Screening                      High Risk


                                                 Consult


      Diagnosis                                  psychosis


      Pertinent Medical Hx/Surgical Hx           no H&P as of this time


      Subjective Information                     Consult received for poor


                                                 appetite. But SHRUTHI Rahman


                                                 reported pt ate well, consumed


                                                 75% breakfast this morning.


                                                 Pt requested strawberry Ensure


                                                 .


      Current Diet Order/ Nutrition Support      regular


      Pertinent Medications                      vit C, theragran


      Pertinent Labs                              reviewed


     Nutritional Hx/Data


      Height                                     1.78 m


      Height (Calculated Centimeters)            177.8


      Current Weight (lbs)                       54.431 kg


      Weight (Calculated Kilograms)              54.4


      Weight (Calculated Grams)                  07715.1


      Ideal Body Weight                          166


      Body Mass Index (BMI)                      17.2


      Weight Status                              Underweight


     GI Symptoms


      GI Symptoms                                None


      Last BM                                    not indicated


      Difficult in:                              None


      Skin Integrity/Comment:                    pressure area to reddened


                                                 sacrume, abrasion to lower


                                                 left extremity


      Current %PO                                Good (%)


     Estimated Nutritional Goals


      BEE in Kcals:                              Using Current wt


      Calories/Kcals/Kg                          27-32


      Kcals Calculated                           8202-2992


      Protein:                                   Using Current wt


      Protein g/k-1.2


      Protein Calculated                         55-66


      Fluid: ml                                  1485-1760ml (1ml/kcal)


     Nutritional Problem


      1. Problem


       Problem                                   underweight


       Etiology                                  possible inadequate energy


                                                 intake


       Signs/Symptoms:                           BMI 17.2


     Intervention/Recommendation


      Comments                                   1. Continue with regular diet


                                                 as ordered. Add Ensure BID (


                                                 strawberry flavor).


                                                 2. Monitor PO intake, wt, labs


                                                 and skin integrity


                                                 3. F/U as low risk in 7 days


     Expected Outcomes/Goals


      Expected Outcomes/Goals                    1. PO intake to meet at least


                                                 75% of nutritional needs.


                                                 2. Wt stability, skin to


                                                 remain intact, labs to


                                                 approach WNL.

## 2019-05-30 NOTE — PROGRESS NOTES
DATE:  05/29/2019



Covering for Dr. Che.



Case was discussed with staff of the patient, reviewed records.  The patient

continues to isolate himself, does not say much.  He is feeding himself.  He has

complained about not having food.  He is sleeping better, eating better.  No

suicidal ideation, no homicidal ideation, no paranoia, no side effects.  We will

continue to work with the patient in group therapy, milieu therapy, and adjust

the medication as needed.





DD: 05/29/2019 12:00

DT: 05/30/2019 00:14

JOB# [id number]  [id number]

## 2019-05-31 RX ADMIN — HALOPERIDOL SCH MG: 2 SOLUTION ORAL at 17:17

## 2019-05-31 RX ADMIN — HALOPERIDOL LACTATE SCH MG: 2 SOLUTION, CONCENTRATE ORAL at 08:59

## 2019-05-31 NOTE — PROGRESS NOTES
DATE:  05/30/2019



Case was discussed with staff of the patient, reviewed records.  The patient

continues to doing more or less the same.  He stays in bed.  Unable to make safe

plan for self-care, unable to participate in meaningful conversation.  He

continues to have multiple complaints, yet bizarre complaints.  He is compliant

with the medication with no side effects, no sedation, no nausea and no

extrapyramidal symptoms.  We will continue to work with the patient in group

therapy, milieu therapy, and adjust the medications as needed.





DD: 05/30/2019 14:10

DT: 05/31/2019 01:55

JOB# [id number]  [id number]

## 2019-05-31 NOTE — INTERNAL MEDICINE PROG NOTE
Internal Medicine Subjective





- Subjective


Patient seen and examined:: with staff, chart reviewed


Patient is:: awake, verbal, interactive, confused


Per staff patient has:: no adverse event, poor appetite





Internal Medicine Objective





- Results


Recent Labs: 


 Laboratory Last Values











Triglycerides  53 mg/dL (<150)   19  06:10    


 


Cholesterol  115 mg/dL (<200)   19  06:10    


 


LDL Cholesterol Direct  61 mg/dL ()  L  19  06:10    


 


HDL Cholesterol  36 mg/dL (23-92)   19  06:10    














- Physical Exam


Vitals and I&O: 


 Vital Signs











Temp  97.6 F   19 06:40


 


Pulse  68   19 06:40


 


Resp  19   19 08:00


 


BP  118/68   19 06:40


 


Pulse Ox  96   19 06:40








 Intake & Output











 19





 18:59 06:59 18:59


 


Intake Total 1300 360 


 


Balance 1300 360 


 


Intake:   


 


  Oral 1300 360 


 


Other:   


 


  # Voids 4 2 


 


  # Bowel Movements 0 0 











Active Medications: 


Current Medications





Acetaminophen (Tylenol)  650 mg PO Q4HR PRN


   PRN Reason: Mild Pain / Temp above 100


   Stop: 19 00:31


Al Hydrox/Mg Hydrox/Simethicone (Maalox)  30 ml PO Q4HR PRN


   PRN Reason: GI DISTRESS


   Stop: 19 00:31


Ascorbic Acid (Vitamin C)  500 mg PO DAILY GABY


   Stop: 19 08:59


   Last Admin: 19 08:58 Dose:  500 mg


Haloperidol Lactate (Haldol Concentrate 10mg/5ml Susp)  5 mg PO BID GABY; 

Protocol


   Stop: 19 08:59


Lorazepam (Ativan)  0.5 mg PO Q4HR PRN; Protocol


   PRN Reason: Anxiety


   Stop: 19 00:49


   Last Admin: 19 17:15 Dose:  0.5 mg


Magnesium Hydroxide (Milk Of Magnesia)  30 ml PO HS PRN


   PRN Reason: Constipation


Multivitamins/Vitamin C (Theragran)  1 tab PO DAILY GABY


   Stop: 19 08:59


   Last Admin: 19 08:58 Dose:  1 tab


Trazodone HCl (Desyrel)  50 mg PO HS GABY; Protocol


   Stop: 19 20:59


   Last Admin: 19 21:12 Dose:  50 mg


Zolpidem Tartrate (Ambien)  5 mg PO HS PRN


   PRN Reason: Insomnia


   Stop: 19 00:49


   Last Admin: 19 21:16 Dose:  5 mg








General: demented


HEENT: NC/AT, PERRLA, EOMI


Neck: Supple, No JVD


Lungs: CTAB


Cardiovascular: RRR, Normal S1, Normal S2


Abdomen: soft, non-tender, positive bowel sound


Extremities: excoriation


Neurological: no change





Internal Medicine Assmt/Plan





- Assessment


Assessment: 





ASSESSMENT AND PLAN:  Dehydration, malnutrition, anemia, renal insufficiency. 


generalized weakness








- Plan


Plan: 





PLAN:  


We will provide the patient adequate nutritional support.  


will provide hydration, supplements, sypmtomatic care and treatment.  


We will continue to follow.








Nutritional Asmnt/Malnutr-PDOC





- Dietary Evaluation


Malnutrition Findings (Please click <Entered> for more info): 








Nutritional Asmnt/Malnutrition                             Start:  19 14:

01


Text:                                                      Status: Complete    

  


Freq:                                                                          

  


Protocol:                                                                      

  


 Document     19 14:02  LCHENG  (Rec: 19 14:02  LCHENG  [id number])


 Nutritional Asmnt/Malnutrition


     Patient General Information


      Nutritional Screening                      High Risk


                                                 Consult


      Diagnosis                                  psychosis


      Pertinent Medical Hx/Surgical Hx           no H&P as of this time


      Subjective Information                     Consult received for poor


                                                 appetite. But SHRUTHI Rahman


                                                 reported pt ate well, consumed


                                                 75% breakfast this morning.


                                                 Pt requested strawberry Ensure


                                                 .


      Current Diet Order/ Nutrition Support      regular


      Pertinent Medications                      vit C, theragran


      Pertinent Labs                              reviewed


     Nutritional Hx/Data


      Height                                     1.78 m


      Height (Calculated Centimeters)            177.8


      Current Weight (lbs)                       54.431 kg


      Weight (Calculated Kilograms)              54.4


      Weight (Calculated Grams)                  68619.1


      Ideal Body Weight                          166


      Body Mass Index (BMI)                      17.2


      Weight Status                              Underweight


     GI Symptoms


      GI Symptoms                                None


      Last BM                                    not indicated


      Difficult in:                              None


      Skin Integrity/Comment:                    pressure area to reddened


                                                 sacrume, abrasion to lower


                                                 left extremity


      Current %PO                                Good (%)


     Estimated Nutritional Goals


      BEE in Kcals:                              Using Current wt


      Calories/Kcals/Kg                          27-32


      Kcals Calculated                           9663-5918


      Protein:                                   Using Current wt


      Protein g/k-1.2


      Protein Calculated                         55-66


      Fluid: ml                                  1485-1760ml (1ml/kcal)


     Nutritional Problem


      1. Problem


       Problem                                   underweight


       Etiology                                  possible inadequate energy


                                                 intake


       Signs/Symptoms:                           BMI 17.2


     Intervention/Recommendation


      Comments                                   1. Continue with regular diet


                                                 as ordered. Add Ensure BID (


                                                 strawberry flavor).


                                                 2. Monitor PO intake, wt, labs


                                                 and skin integrity


                                                 3. F/U as low risk in 7 days


     Expected Outcomes/Goals


      Expected Outcomes/Goals                    1. PO intake to meet at least


                                                 75% of nutritional needs.


                                                 2. Wt stability, skin to


                                                 remain intact, labs to


                                                 approach WNL.

## 2019-06-01 RX ADMIN — HALOPERIDOL SCH: 2 SOLUTION ORAL at 18:00

## 2019-06-01 RX ADMIN — HALOPERIDOL SCH MG: 2 SOLUTION ORAL at 08:34

## 2019-06-01 NOTE — PROGRESS NOTES
DATE:  05/31/2019



SUBJECTIVE:  Case was discussed with staff of the patient, reviewed records. 

The patient continues to stay in bed, isolating himself.  Continues to be

confused.  He is able to talk, but he really does not say much.  He is sleeping

well.  He can feed himself.  No side effects with the medication, no sedation,

no nausea.  He is on Haldol 5 mg twice a day.  No side effects of the

medication, no sedation, no nausea.  We will continue to work with the patient

in group therapy, milieu therapy, adjusting medication as needed.





DD: 05/31/2019 12:09

DT: 06/01/2019 01:13

JOB# [id number]  [id number]

## 2019-06-02 RX ADMIN — HALOPERIDOL SCH MG: 2 SOLUTION ORAL at 09:23

## 2019-06-02 RX ADMIN — HALOPERIDOL SCH MG: 2 SOLUTION ORAL at 17:41

## 2019-06-02 NOTE — INTERNAL MEDICINE PROG NOTE
Internal Medicine Subjective





- Subjective


Patient seen and examined:: with staff, chart reviewed


Patient is:: awake, verbal, interactive, confused


Per staff patient has:: no adverse event, poor appetite





Internal Medicine Objective





- Results


Recent Labs: 


 Laboratory Last Values











Triglycerides  53 mg/dL (<150)   19  06:10    


 


Cholesterol  115 mg/dL (<200)   19  06:10    


 


LDL Cholesterol Direct  61 mg/dL ()  L  19  06:10    


 


HDL Cholesterol  36 mg/dL (23-92)   19  06:10    














- Physical Exam


Vitals and I&O: 


 Vital Signs











Temp  98.3 F   19 06:22


 


Pulse  64   19 06:22


 


Resp  18   19 06:22


 


BP  117/72   19 06:22


 


Pulse Ox  97   19 06:22








 Intake & Output











 19





 18:59 06:59 18:59


 


Intake Total  240 


 


Balance  240 


 


Intake:   


 


  Oral  240 


 


Other:   


 


  # Voids 2 3 


 


  # Bowel Movements 1  


 


  Stool Characteristics Soft Soft 





 Formed Formed 











Active Medications: 


Current Medications





Acetaminophen (Tylenol)  650 mg PO Q4HR PRN


   PRN Reason: Mild Pain / Temp above 100


   Stop: 19 00:31


Al Hydrox/Mg Hydrox/Simethicone (Maalox)  30 ml PO Q4HR PRN


   PRN Reason: GI DISTRESS


   Stop: 19 00:31


Ascorbic Acid (Vitamin C)  500 mg PO DAILY GABY


   Stop: 19 08:59


   Last Admin: 19 09:22 Dose:  500 mg


Haloperidol Lactate (Haldol Concentrate 10mg/5ml Susp)  5 mg PO BID GABY; 

Protocol


   Stop: 19 16:59


   Last Admin: 19 09:23 Dose:  5 mg


Lorazepam (Ativan)  0.5 mg PO Q4HR PRN; Protocol


   PRN Reason: Anxiety


   Stop: 19 00:49


   Last Admin: 19 17:15 Dose:  0.5 mg


Magnesium Hydroxide (Milk Of Magnesia)  30 ml PO HS PRN


   PRN Reason: Constipation


Multivitamins/Vitamin C (Theragran)  1 tab PO DAILY GABY


   Stop: 19 08:59


   Last Admin: 19 09:22 Dose:  1 tab


Trazodone HCl (Desyrel)  50 mg PO HS GABY; Protocol


   Stop: 19 20:59


   Last Admin: 19 22:00 Dose:  50 mg


Zolpidem Tartrate (Ambien)  5 mg PO HS PRN


   PRN Reason: Insomnia


   Stop: 19 00:49


   Last Admin: 19 21:16 Dose:  5 mg








General: demented


HEENT: NC/AT, PERRLA, EOMI


Neck: Supple, No JVD


Lungs: CTAB


Cardiovascular: RRR, Normal S1, Normal S2


Abdomen: soft, non-tender, positive bowel sound


Extremities: excoriation


Neurological: no change





Internal Medicine Assmt/Plan





- Assessment


Assessment: 





ASSESSMENT AND PLAN:  Dehydration, malnutrition, anemia, renal insufficiency. 


generalized weakness








- Plan


Plan: 





PLAN:  


We will provide the patient adequate nutritional support.  


will provide hydration, supplements, sypmtomatic care and treatment.  


We will continue to follow.








Nutritional Asmnt/Malnutr-PDOC





- Dietary Evaluation


Malnutrition Findings (Please click <Entered> for more info): 








Nutritional Asmnt/Malnutrition                             Start:  19 14:

01


Text:                                                      Status: Complete    

  


Freq:                                                                          

  


Protocol:                                                                      

  


 Document     19 14:02  LCHENG  (Rec: 19 14:02  LCHENG  [id number])


 Nutritional Asmnt/Malnutrition


     Patient General Information


      Nutritional Screening                      High Risk


                                                 Consult


      Diagnosis                                  psychosis


      Pertinent Medical Hx/Surgical Hx           no H&P as of this time


      Subjective Information                     Consult received for poor


                                                 appetite. But SHRUTHI Rahman


                                                 reported pt ate well, consumed


                                                 75% breakfast this morning.


                                                 Pt requested strawberry Ensure


                                                 .


      Current Diet Order/ Nutrition Support      regular


      Pertinent Medications                      vit C, theragran


      Pertinent Labs                              reviewed


     Nutritional Hx/Data


      Height                                     1.78 m


      Height (Calculated Centimeters)            177.8


      Current Weight (lbs)                       54.431 kg


      Weight (Calculated Kilograms)              54.4


      Weight (Calculated Grams)                  74204.1


      Ideal Body Weight                          166


      Body Mass Index (BMI)                      17.2


      Weight Status                              Underweight


     GI Symptoms


      GI Symptoms                                None


      Last BM                                    not indicated


      Difficult in:                              None


      Skin Integrity/Comment:                    pressure area to reddened


                                                 sacrume, abrasion to lower


                                                 left extremity


      Current %PO                                Good (%)


     Estimated Nutritional Goals


      BEE in Kcals:                              Using Current wt


      Calories/Kcals/Kg                          27-32


      Kcals Calculated                           0849-1345


      Protein:                                   Using Current wt


      Protein g/k-1.2


      Protein Calculated                         55-66


      Fluid: ml                                  1485-1760ml (1ml/kcal)


     Nutritional Problem


      1. Problem


       Problem                                   underweight


       Etiology                                  possible inadequate energy


                                                 intake


       Signs/Symptoms:                           BMI 17.2


     Intervention/Recommendation


      Comments                                   1. Continue with regular diet


                                                 as ordered. Add Ensure BID (


                                                 strawberry flavor).


                                                 2. Monitor PO intake, wt, labs


                                                 and skin integrity


                                                 3. F/U as low risk in 7 days


     Expected Outcomes/Goals


      Expected Outcomes/Goals                    1. PO intake to meet at least


                                                 75% of nutritional needs.


                                                 2. Wt stability, skin to


                                                 remain intact, labs to


                                                 approach WNL.

## 2019-06-02 NOTE — PROGRESS NOTES
DATE:  06/01/2019



SUBJECTIVE:  Case was discussed with staff of the patient, reviewed records. 

The patient continues to isolate himself.  Continues to be confused, continues

to awake, verbal.  Unable to make safe plan for self-care, unpredictable,

impulsive, needing redirection, very poor insight.  No side effects with the

medication, no sedation, no nausea.  We will continue outpatient group therapy,

milieu therapy, adjust medication as needed.





DD: 06/01/2019 12:23

DT: 06/02/2019 00:11

[facility]# [id number]  [id number]

## 2019-06-03 RX ADMIN — HALOPERIDOL SCH MG: 2 SOLUTION ORAL at 16:02

## 2019-06-03 RX ADMIN — HALOPERIDOL SCH MG: 2 SOLUTION ORAL at 08:06

## 2019-06-03 NOTE — PROGRESS NOTES
DATE:  06/02/2019



Case was discussed with staff of the patient, reviewed records.  The patient

continues ____, continues to have poor insight.  Unable to make safe plan for

self-care, unpredictable, impulsive, needing redirection.  He is compliant with

the medication with no side effects, no sedation, no nausea, no extrapyramidal

symptoms.  We will continue to work with the patient in group therapy, milieu

therapy, and adjust the medications as needed.





DD: 06/02/2019 12:33

DT: 06/03/2019 00:22

JOB# [id number]  [id number]

## 2019-06-03 NOTE — INTERNAL MEDICINE PROG NOTE
Internal Medicine Subjective





- Subjective


Patient seen and examined:: with staff, chart reviewed


Patient is:: awake, verbal, interactive, confused


Per staff patient has:: no adverse event, poor appetite





Internal Medicine Objective





- Results


Recent Labs: 


 Laboratory Last Values











Triglycerides  53 mg/dL (<150)   19  06:10    


 


Cholesterol  115 mg/dL (<200)   19  06:10    


 


LDL Cholesterol Direct  61 mg/dL ()  L  19  06:10    


 


HDL Cholesterol  36 mg/dL (23-92)   19  06:10    














- Physical Exam


Vitals and I&O: 


 Vital Signs











Temp  97.4 F   19 06:27


 


Pulse  65   19 06:27


 


Resp  18   19 06:27


 


BP  106/67   19 06:27


 


Pulse Ox  97   19 06:27








 Intake & Output











 19





 18:59 06:59 18:59


 


Intake Total 480 120 


 


Output Total 600  


 


Balance -120 120 


 


Intake:   


 


  Oral 480 120 


 


Output:   


 


  Urine 600  


 


Other:   


 


  # Voids  3 


 


  # Bowel Movements 0  


 


  Stool Characteristics Soft Soft 





 Formed Formed 











Active Medications: 


Current Medications





Acetaminophen (Tylenol)  650 mg PO Q4HR PRN


   PRN Reason: Mild Pain / Temp above 100


   Stop: 19 00:31


Al Hydrox/Mg Hydrox/Simethicone (Maalox)  30 ml PO Q4HR PRN


   PRN Reason: GI DISTRESS


   Stop: 19 00:31


Ascorbic Acid (Vitamin C)  500 mg PO DAILY GABY


   Stop: 19 08:59


   Last Admin: 19 08:07 Dose:  500 mg


Haloperidol Lactate (Haldol Concentrate 10mg/5ml Susp)  5 mg PO BID GABY; 

Protocol


   Stop: 19 16:59


   Last Admin: 19 08:06 Dose:  5 mg


Lorazepam (Ativan)  0.5 mg PO Q4HR PRN; Protocol


   PRN Reason: Anxiety


   Stop: 19 00:49


   Last Admin: 19 17:15 Dose:  0.5 mg


Magnesium Hydroxide (Milk Of Magnesia)  30 ml PO HS PRN


   PRN Reason: Constipation


Multivitamins/Vitamin C (Theragran)  1 tab PO DAILY GABY


   Stop: 19 08:59


   Last Admin: 19 08:07 Dose:  1 tab


Trazodone HCl (Desyrel)  50 mg PO HS GABY; Protocol


   Stop: 19 20:59


   Last Admin: 19 20:42 Dose:  50 mg


Zolpidem Tartrate (Ambien)  5 mg PO HS PRN


   PRN Reason: Insomnia


   Stop: 19 00:49


   Last Admin: 19 21:16 Dose:  5 mg








General: demented


HEENT: NC/AT, PERRLA, EOMI


Neck: Supple, No JVD


Lungs: CTAB


Cardiovascular: RRR, Normal S1, Normal S2


Abdomen: soft, non-tender, positive bowel sound


Extremities: excoriation


Neurological: no change





Internal Medicine Assmt/Plan





- Assessment


Assessment: 





ASSESSMENT AND PLAN:  Dehydration, malnutrition, anemia, renal insufficiency. 


generalized weakness








- Plan


Plan: 





PLAN:  


We will provide the patient adequate nutritional support.  


will provide hydration, supplements, sypmtomatic care and treatment.  


We will continue to follow.








Nutritional Asmnt/Malnutr-PDOC





- Dietary Evaluation


Malnutrition Findings (Please click <Entered> for more info): 








Nutritional Asmnt/Malnutrition                             Start:  19 14:

01


Text:                                                      Status: Complete    

  


Freq:                                                                          

  


Protocol:                                                                      

  


 Document     19 14:02  LCHENG  (Rec: 19 14:02  LCHENG  [id number])


 Nutritional Asmnt/Malnutrition


     Patient General Information


      Nutritional Screening                      High Risk


                                                 Consult


      Diagnosis                                  psychosis


      Pertinent Medical Hx/Surgical Hx           no H&P as of this time


      Subjective Information                     Consult received for poor


                                                 appetite. But SHRUTHI Rahman


                                                 reported pt ate well, consumed


                                                 75% breakfast this morning.


                                                 Pt requested strawberry Ensure


                                                 .


      Current Diet Order/ Nutrition Support      regular


      Pertinent Medications                      vit C, theragran


      Pertinent Labs                              reviewed


     Nutritional Hx/Data


      Height                                     1.78 m


      Height (Calculated Centimeters)            177.8


      Current Weight (lbs)                       54.431 kg


      Weight (Calculated Kilograms)              54.4


      Weight (Calculated Grams)                  47752.1


      Ideal Body Weight                          166


      Body Mass Index (BMI)                      17.2


      Weight Status                              Underweight


     GI Symptoms


      GI Symptoms                                None


      Last BM                                    not indicated


      Difficult in:                              None


      Skin Integrity/Comment:                    pressure area to reddened


                                                 sacrume, abrasion to lower


                                                 left extremity


      Current %PO                                Good (%)


     Estimated Nutritional Goals


      BEE in Kcals:                              Using Current wt


      Calories/Kcals/Kg                          27-32


      Kcals Calculated                           3365-5291


      Protein:                                   Using Current wt


      Protein g/k-1.2


      Protein Calculated                         55-66


      Fluid: ml                                  1485-1760ml (1ml/kcal)


     Nutritional Problem


      1. Problem


       Problem                                   underweight


       Etiology                                  possible inadequate energy


                                                 intake


       Signs/Symptoms:                           BMI 17.2


     Intervention/Recommendation


      Comments                                   1. Continue with regular diet


                                                 as ordered. Add Ensure BID (


                                                 strawberry flavor).


                                                 2. Monitor PO intake, wt, labs


                                                 and skin integrity


                                                 3. F/U as low risk in 7 days


     Expected Outcomes/Goals


      Expected Outcomes/Goals                    1. PO intake to meet at least


                                                 75% of nutritional needs.


                                                 2. Wt stability, skin to


                                                 remain intact, labs to


                                                 approach WNL.

## 2019-06-04 NOTE — PROGRESS NOTES
DATE:  06/03/2019



SUBJECTIVE:  The patient is currently in the hospital.  Had been catatonic, not

talking, not eating at [facility].  Still noted to be pretty

bizarre on exam, rambling, confused, talking nonsense.  Poor insight, unable to

verbalize any sort of plan for self-care.  It is unclear if he is currently

approaching his baseline.   note he is living with family but

they cannot manage him, he needs SNF placement.  We will contact 

to see where the patient is able to go.  The patient apparently had been on

dosing of Risperdal in the past.



PLAN:  We will continue to monitor.  We will consider changing Haldol to dosing

of Risperdal.  We will continue to monitor.





DD: 06/03/2019 16:06

DT: 06/04/2019 03:45

JOB# [id number]  [id number]

## 2019-06-04 NOTE — PROGRESS NOTES
DATE:  06/04/2019



SUBJECTIVE:  The patient is still bizarre, making some bizarre statements, is

nonsensical on exam.  Apparently per collateral, he has done fairly well with

Risperdal in the past, so I am increasing dosages of Risperdal.  The patient is

still convinced that he is not getting anything to eat, but he is eating every

night, still convinced that he needs more food, this seems to be one of his

symptoms.  Still symptomatic, keeping to himself, unable to be cared for at a

lower level of care given the severity of his symptoms.



PLAN:  We will continue to monitor, increase dosing of Risperdal today.





DD: 06/04/2019 13:31

DT: 06/04/2019 22:14

[facility]# [id number]  [id number]

## 2019-06-04 NOTE — INTERNAL MEDICINE PROG NOTE
Internal Medicine Subjective





- Subjective


Patient seen and examined:: with staff, chart reviewed


Patient is:: awake, verbal, interactive, confused


Per staff patient has:: no adverse event, poor appetite





Internal Medicine Objective





- Results


Recent Labs: 


 Laboratory Last Values











Triglycerides  53 mg/dL (<150)   19  06:10    


 


Cholesterol  115 mg/dL (<200)   19  06:10    


 


LDL Cholesterol Direct  61 mg/dL ()  L  19  06:10    


 


HDL Cholesterol  36 mg/dL (23-92)   19  06:10    














- Physical Exam


Vitals and I&O: 


 Vital Signs











Temp  97.6 F   19 06:00


 


Pulse  76   19 06:00


 


Resp  20   19 06:00


 


BP  112/81   19 06:00


 


Pulse Ox  98   19 06:00








 Intake & Output











 19





 18:59 06:59 18:59


 


Intake Total 1200 240 


 


Balance 1200 240 


 


Intake:   


 


  Oral 1200 240 


 


Other:   


 


  # Voids 4 2 











Active Medications: 


Current Medications





Acetaminophen (Tylenol)  650 mg PO Q4HR PRN


   PRN Reason: Mild Pain / Temp above 100


   Stop: 19 00:31


Al Hydrox/Mg Hydrox/Simethicone (Maalox)  30 ml PO Q4HR PRN


   PRN Reason: GI DISTRESS


   Stop: 19 00:31


Ascorbic Acid (Vitamin C)  500 mg PO DAILY GABY


   Stop: 19 08:59


   Last Admin: 19 08:07 Dose:  500 mg


Lorazepam (Ativan)  0.5 mg PO Q4HR PRN; Protocol


   PRN Reason: Anxiety


   Stop: 19 00:49


   Last Admin: 19 08:08 Dose:  0.5 mg


Magnesium Hydroxide (Milk Of Magnesia)  30 ml PO HS PRN


   PRN Reason: Constipation


Multivitamins/Vitamin C (Theragran)  1 tab PO DAILY GABY


   Stop: 19 08:59


   Last Admin: 19 08:08 Dose:  1 tab


Risperidone (Risperdal)  1 mg PO DAILY GABY; Protocol


   Stop: 19 08:59


   Last Admin: 19 08:08 Dose:  1 mg


Trazodone HCl (Desyrel)  50 mg PO HS GABY; Protocol


   Stop: 19 20:59


   Last Admin: 19 21:04 Dose:  50 mg


Zolpidem Tartrate (Ambien)  5 mg PO HS PRN


   PRN Reason: Insomnia


   Stop: 19 00:49


   Last Admin: 19 21:16 Dose:  5 mg








General: demented


HEENT: NC/AT, PERRLA, EOMI


Neck: Supple, No JVD


Lungs: CTAB


Cardiovascular: RRR, Normal S1, Normal S2


Abdomen: soft, non-tender, positive bowel sound


Extremities: excoriation


Neurological: no change





Internal Medicine Assmt/Plan





- Assessment


Assessment: 





ASSESSMENT AND PLAN:  Dehydration, malnutrition, anemia, renal insufficiency. 


generalized weakness








- Plan


Plan: 





PLAN:  


We will provide the patient adequate nutritional support.  


will provide hydration, supplements, sypmtomatic care and treatment.  


We will continue to follow.








Nutritional Asmnt/Malnutr-PDOC





- Dietary Evaluation


Malnutrition Findings (Please click <Entered> for more info): 








Nutritional Asmnt/Malnutrition                             Start:  19 14:

01


Text:                                                      Status: Complete    

  


Freq:                                                                          

  


Protocol:                                                                      

  


 Document     19 14:02  LCHENG  (Rec: 19 14:02  LCHENG  [id number])


 Nutritional Asmnt/Malnutrition


     Patient General Information


      Nutritional Screening                      High Risk


                                                 Consult


      Diagnosis                                  psychosis


      Pertinent Medical Hx/Surgical Hx           no H&P as of this time


      Subjective Information                     Consult received for poor


                                                 appetite. But SHRUTHI Rahman


                                                 reported pt ate well, consumed


                                                 75% breakfast this morning.


                                                 Pt requested strawberry Ensure


                                                 .


      Current Diet Order/ Nutrition Support      regular


      Pertinent Medications                      vit C, theragran


      Pertinent Labs                              reviewed


     Nutritional Hx/Data


      Height                                     1.78 m


      Height (Calculated Centimeters)            177.8


      Current Weight (lbs)                       54.431 kg


      Weight (Calculated Kilograms)              54.4


      Weight (Calculated Grams)                  24411.1


      Ideal Body Weight                          166


      Body Mass Index (BMI)                      17.2


      Weight Status                              Underweight


     GI Symptoms


      GI Symptoms                                None


      Last BM                                    not indicated


      Difficult in:                              None


      Skin Integrity/Comment:                    pressure area to reddened


                                                 sacrume, abrasion to lower


                                                 left extremity


      Current %PO                                Good (%)


     Estimated Nutritional Goals


      BEE in Kcals:                              Using Current wt


      Calories/Kcals/Kg                          27-32


      Kcals Calculated                           0970-5896


      Protein:                                   Using Current wt


      Protein g/k-1.2


      Protein Calculated                         55-66


      Fluid: ml                                  1485-1760ml (1ml/kcal)


     Nutritional Problem


      1. Problem


       Problem                                   underweight


       Etiology                                  possible inadequate energy


                                                 intake


       Signs/Symptoms:                           BMI 17.2


     Intervention/Recommendation


      Comments                                   1. Continue with regular diet


                                                 as ordered. Add Ensure BID (


                                                 strawberry flavor).


                                                 2. Monitor PO intake, wt, labs


                                                 and skin integrity


                                                 3. F/U as low risk in 7 days


     Expected Outcomes/Goals


      Expected Outcomes/Goals                    1. PO intake to meet at least


                                                 75% of nutritional needs.


                                                 2. Wt stability, skin to


                                                 remain intact, labs to


                                                 approach WNL.

## 2019-06-05 NOTE — INTERNAL MEDICINE PROG NOTE
Internal Medicine Subjective





- Subjective


Patient seen and examined:: with staff, chart reviewed


Patient is:: awake, verbal, interactive, confused


Per staff patient has:: no adverse event, poor appetite





Internal Medicine Objective





- Results


Recent Labs: 


 Laboratory Last Values











Triglycerides  53 mg/dL (<150)   19  06:10    


 


Cholesterol  115 mg/dL (<200)   19  06:10    


 


LDL Cholesterol Direct  61 mg/dL ()  L  19  06:10    


 


HDL Cholesterol  36 mg/dL (23-92)   19  06:10    














- Physical Exam


Vitals and I&O: 


 Vital Signs











Temp  98.1 F   19 06:33


 


Pulse  78   19 06:33


 


Resp  19   19 06:33


 


BP  110/85   19 06:33


 


Pulse Ox  100   19 06:33








 Intake & Output











 19





 18:59 06:59 18:59


 


Intake Total 950 120 


 


Balance 950 120 


 


Intake:   


 


  Oral 950 120 


 


Other:   


 


  # Voids 4 3 


 


  # Bowel Movements 0 0 


 


  Stool Characteristics  Soft Soft





  Formed Formed











Active Medications: 


Current Medications





Acetaminophen (Tylenol)  650 mg PO Q4HR PRN


   PRN Reason: Mild Pain / Temp above 100


   Stop: 19 00:31


Al Hydrox/Mg Hydrox/Simethicone (Maalox)  30 ml PO Q4HR PRN


   PRN Reason: GI DISTRESS


   Stop: 19 00:31


Ascorbic Acid (Vitamin C)  500 mg PO DAILY GABY


   Stop: 19 08:59


   Last Admin: 19 09:03 Dose:  500 mg


Lorazepam (Ativan)  0.5 mg PO Q4HR PRN; Protocol


   PRN Reason: Anxiety


   Stop: 19 00:49


   Last Admin: 19 08:08 Dose:  0.5 mg


Magnesium Hydroxide (Milk Of Magnesia)  30 ml PO HS PRN


   PRN Reason: Constipation


Multivitamins/Vitamin C (Theragran)  1 tab PO DAILY GABY


   Stop: 19 08:59


   Last Admin: 19 09:03 Dose:  1 tab


Risperidone (Risperdal)  0.5 mg PO HS GABY; Protocol


   Stop: 19 20:59


   Last Admin: 19 20:29 Dose:  0.5 mg


Risperidone 1 mg/ Risperidone (0.25 mg)  1.25 mg PO DAILY GABY


   Stop: 19 08:59


Trazodone HCl (Desyrel)  50 mg PO HS GABY; Protocol


   Stop: 19 20:59


   Last Admin: 19 20:29 Dose:  50 mg


Zolpidem Tartrate (Ambien)  5 mg PO HS PRN


   PRN Reason: Insomnia


   Stop: 19 00:49


   Last Admin: 19 21:16 Dose:  5 mg








General: demented


HEENT: NC/AT, PERRLA, EOMI


Neck: Supple, No JVD


Lungs: CTAB


Cardiovascular: RRR, Normal S1, Normal S2


Abdomen: soft, non-tender, positive bowel sound


Extremities: excoriation


Neurological: no change





Internal Medicine Assmt/Plan





- Assessment


Assessment: 





ASSESSMENT AND PLAN:  Dehydration, malnutrition, anemia, renal insufficiency. 


generalized weakness








- Plan


Plan: 





PLAN:  


We will provide the patient adequate nutritional support.  


will provide hydration, supplements, sypmtomatic care and treatment.  


We will continue to follow.








Nutritional Asmnt/Malnutr-PDOC





- Dietary Evaluation


Malnutrition Findings (Please click <Entered> for more info): 








Nutritional Asmnt/Malnutrition                             Start:  19 14:

01


Text:                                                      Status: Complete    

  


Freq:                                                                          

  


Protocol:                                                                      

  


 Document     19 14:02  LCHENG  (Rec: 19 14:02  LCHENG  [id number])


 Nutritional Asmnt/Malnutrition


     Patient General Information


      Nutritional Screening                      High Risk


                                                 Consult


      Diagnosis                                  psychosis


      Pertinent Medical Hx/Surgical Hx           no H&P as of this time


      Subjective Information                     Consult received for poor


                                                 appetite. But SHRUTHI Rahman


                                                 reported pt ate well, consumed


                                                 75% breakfast this morning.


                                                 Pt requested strawberry Ensure


                                                 .


      Current Diet Order/ Nutrition Support      regular


      Pertinent Medications                      vit C, theragran


      Pertinent Labs                              reviewed


     Nutritional Hx/Data


      Height                                     1.78 m


      Height (Calculated Centimeters)            177.8


      Current Weight (lbs)                       54.431 kg


      Weight (Calculated Kilograms)              54.4


      Weight (Calculated Grams)                  93828.1


      Ideal Body Weight                          166


      Body Mass Index (BMI)                      17.2


      Weight Status                              Underweight


     GI Symptoms


      GI Symptoms                                None


      Last BM                                    not indicated


      Difficult in:                              None


      Skin Integrity/Comment:                    pressure area to reddened


                                                 sacrume, abrasion to lower


                                                 left extremity


      Current %PO                                Good (%)


     Estimated Nutritional Goals


      BEE in Kcals:                              Using Current wt


      Calories/Kcals/Kg                          27-32


      Kcals Calculated                           3521-7661


      Protein:                                   Using Current wt


      Protein g/k-1.2


      Protein Calculated                         55-66


      Fluid: ml                                  1485-1760ml (1ml/kcal)


     Nutritional Problem


      1. Problem


       Problem                                   underweight


       Etiology                                  possible inadequate energy


                                                 intake


       Signs/Symptoms:                           BMI 17.2


     Intervention/Recommendation


      Comments                                   1. Continue with regular diet


                                                 as ordered. Add Ensure BID (


                                                 strawberry flavor).


                                                 2. Monitor PO intake, wt, labs


                                                 and skin integrity


                                                 3. F/U as low risk in 7 days


     Expected Outcomes/Goals


      Expected Outcomes/Goals                    1. PO intake to meet at least


                                                 75% of nutritional needs.


                                                 2. Wt stability, skin to


                                                 remain intact, labs to


                                                 approach WNL.

## 2019-06-06 NOTE — INTERNAL MEDICINE PROG NOTE
Internal Medicine Subjective





- Subjective


Patient seen and examined:: with staff, chart reviewed


Patient is:: awake, verbal, interactive, confused


Per staff patient has:: no adverse event, poor appetite





Internal Medicine Objective





- Results


Recent Labs: 


 Laboratory Last Values











Triglycerides  53 mg/dL (<150)   19  06:10    


 


Cholesterol  115 mg/dL (<200)   19  06:10    


 


LDL Cholesterol Direct  61 mg/dL ()  L  19  06:10    


 


HDL Cholesterol  36 mg/dL (23-92)   19  06:10    














- Physical Exam


Vitals and I&O: 


 Vital Signs











Temp  97.4 F   19 06:34


 


Pulse  80   19 06:34


 


Resp  18   19 06:34


 


BP  122/75   19 06:34


 


Pulse Ox  97   19 06:34








 Intake & Output











 19





 18:59 06:59 18:59


 


Intake Total  120 


 


Balance  120 


 


Intake:   


 


  Oral  120 


 


Other:   


 


  # Voids 2 3 


 


  # Bowel Movements 0  


 


  Stool Characteristics Soft Soft Soft





 Formed Formed Formed











Active Medications: 


Current Medications





Acetaminophen (Tylenol)  650 mg PO Q4HR PRN


   PRN Reason: Mild Pain / Temp above 100


   Stop: 19 00:31


Al Hydrox/Mg Hydrox/Simethicone (Maalox)  30 ml PO Q4HR PRN


   PRN Reason: GI DISTRESS


   Stop: 19 00:31


Ascorbic Acid (Vitamin C)  500 mg PO DAILY GABY


   Stop: 19 08:59


   Last Admin: 19 09:06 Dose:  500 mg


Lorazepam (Ativan)  0.5 mg PO Q4HR PRN; Protocol


   PRN Reason: Anxiety


   Stop: 19 00:49


   Last Admin: 19 08:08 Dose:  0.5 mg


Magnesium Hydroxide (Milk Of Magnesia)  30 ml PO HS PRN


   PRN Reason: Constipation


Multivitamins/Vitamin C (Theragran)  1 tab PO DAILY GABY


   Stop: 19 08:59


   Last Admin: 19 09:06 Dose:  1 tab


Risperidone (Risperdal)  0.5 mg PO HS GABY; Protocol


   Stop: 19 20:59


   Last Admin: 19 21:09 Dose:  0.5 mg


Risperidone 1 mg/ Risperidone (0.25 mg)  1.25 mg PO DAILY GABY


   Stop: 19 08:59


   Last Admin: 19 09:06 Dose:  1.25 mg


Trazodone HCl (Desyrel)  50 mg PO HS GABY; Protocol


   Stop: 19 20:59


   Last Admin: 19 21:09 Dose:  50 mg


Zolpidem Tartrate (Ambien)  5 mg PO HS PRN


   PRN Reason: Insomnia


   Stop: 19 00:49


   Last Admin: 19 21:16 Dose:  5 mg








General: demented


HEENT: NC/AT, PERRLA, EOMI


Neck: Supple, No JVD


Lungs: CTAB


Cardiovascular: RRR, Normal S1, Normal S2


Abdomen: soft, non-tender, positive bowel sound


Extremities: excoriation


Neurological: no change





Internal Medicine Assmt/Plan





- Assessment


Assessment: 





ASSESSMENT AND PLAN:  Dehydration, malnutrition, anemia, renal insufficiency. 


generalized weakness








- Plan


Plan: 





PLAN:  


We will provide the patient adequate nutritional support.  


will provide hydration, supplements, sypmtomatic care and treatment.  


We will continue to follow.








Nutritional Asmnt/Malnutr-PDOC





- Dietary Evaluation


Malnutrition Findings (Please click <Entered> for more info): 








Nutritional Asmnt/Malnutrition                             Start:  19 14:

01


Text:                                                      Status: Complete    

  


Freq:                                                                          

  


Protocol:                                                                      

  


 Document     19 14:02  LCHENG  (Rec: 19 14:02  LCHENG  [id number])


 Nutritional Asmnt/Malnutrition


     Patient General Information


      Nutritional Screening                      High Risk


                                                 Consult


      Diagnosis                                  psychosis


      Pertinent Medical Hx/Surgical Hx           no H&P as of this time


      Subjective Information                     Consult received for poor


                                                 appetite. But SHRUTHI Rahman


                                                 reported pt ate well, consumed


                                                 75% breakfast this morning.


                                                 Pt requested strawberry Ensure


                                                 .


      Current Diet Order/ Nutrition Support      regular


      Pertinent Medications                      vit C, theragran


      Pertinent Labs                              reviewed


     Nutritional Hx/Data


      Height                                     1.78 m


      Height (Calculated Centimeters)            177.8


      Current Weight (lbs)                       54.431 kg


      Weight (Calculated Kilograms)              54.4


      Weight (Calculated Grams)                  92553.1


      Ideal Body Weight                          166


      Body Mass Index (BMI)                      17.2


      Weight Status                              Underweight


     GI Symptoms


      GI Symptoms                                None


      Last BM                                    not indicated


      Difficult in:                              None


      Skin Integrity/Comment:                    pressure area to reddened


                                                 sacrume, abrasion to lower


                                                 left extremity


      Current %PO                                Good (%)


     Estimated Nutritional Goals


      BEE in Kcals:                              Using Current wt


      Calories/Kcals/Kg                          27-32


      Kcals Calculated                           9294-7911


      Protein:                                   Using Current wt


      Protein g/k-1.2


      Protein Calculated                         55-66


      Fluid: ml                                  1485-1760ml (1ml/kcal)


     Nutritional Problem


      1. Problem


       Problem                                   underweight


       Etiology                                  possible inadequate energy


                                                 intake


       Signs/Symptoms:                           BMI 17.2


     Intervention/Recommendation


      Comments                                   1. Continue with regular diet


                                                 as ordered. Add Ensure BID (


                                                 strawberry flavor).


                                                 2. Monitor PO intake, wt, labs


                                                 and skin integrity


                                                 3. F/U as low risk in 7 days


     Expected Outcomes/Goals


      Expected Outcomes/Goals                    1. PO intake to meet at least


                                                 75% of nutritional needs.


                                                 2. Wt stability, skin to


                                                 remain intact, labs to


                                                 approach WNL.

## 2019-06-06 NOTE — PROGRESS NOTES
DATE:     06/05/2019



SUBJECTIVE:  The patient in the hospital, very confused, still believing that

people are not feeding him,____ making some odd comments, bizarre, internally

preoccupied, disorganized.  Currently on dosing of Risperdal.  Vitals were

reviewed.  No side effects.  No EPS.  No oversedation.  Pulse also reviewed. 

Fair sleep, needing prompting for ADLs.



ASSESSMENT:  Ongoing psychotic symptoms, bizarre ideations.



PLAN:  We will continue to monitor.  I will be continuing to increase the dose

of the medication with increased dose of Risperdal today.





DD: 06/05/2019 11:44

DT: 06/05/2019 17:53

JOB# [id number]  [id number]

VIN

## 2019-06-07 NOTE — INTERNAL MEDICINE PROG NOTE
Internal Medicine Subjective





- Subjective


Patient seen and examined:: with staff, chart reviewed


Patient is:: awake, verbal, interactive, confused


Per staff patient has:: no adverse event, poor appetite





Internal Medicine Objective





- Results


Recent Labs: 


 Laboratory Last Values











Triglycerides  53 mg/dL (<150)   19  06:10    


 


Cholesterol  115 mg/dL (<200)   19  06:10    


 


LDL Cholesterol Direct  61 mg/dL ()  L  19  06:10    


 


HDL Cholesterol  36 mg/dL (23-92)   19  06:10    














- Physical Exam


Vitals and I&O: 


 Vital Signs











Temp  97.1 F   19 06:16


 


Pulse  95   19 06:16


 


Resp  19   19 06:16


 


BP  113/79   19 06:16


 


Pulse Ox  98   19 06:16








 Intake & Output











 19





 18:59 06:59 18:59


 


Intake Total  120 


 


Balance  120 


 


Intake:   


 


  Oral  120 


 


Other:   


 


  # Voids 2 2 


 


  # Bowel Movements 0 1 


 


  Stool Characteristics Soft Soft Soft





 Formed Formed Formed











Active Medications: 


Current Medications





Acetaminophen (Tylenol)  650 mg PO Q4HR PRN


   PRN Reason: Mild Pain / Temp above 100


   Stop: 19 00:31


Al Hydrox/Mg Hydrox/Simethicone (Maalox)  30 ml PO Q4HR PRN


   PRN Reason: GI DISTRESS


   Stop: 19 00:31


Ascorbic Acid (Vitamin C)  500 mg PO DAILY GABY


   Stop: 19 08:59


   Last Admin: 19 08:45 Dose:  500 mg


Lorazepam (Ativan)  0.5 mg PO Q4HR PRN; Protocol


   PRN Reason: Anxiety


   Stop: 19 00:49


   Last Admin: 19 08:08 Dose:  0.5 mg


Magnesium Hydroxide (Milk Of Magnesia)  30 ml PO HS PRN


   PRN Reason: Constipation


Multivitamins/Vitamin C (Theragran)  1 tab PO DAILY GABY


   Stop: 19 08:59


   Last Admin: 19 08:45 Dose:  1 tab


Risperidone (Risperdal)  0.5 mg PO HS GABY; Protocol


   Stop: 19 20:59


   Last Admin: 19 20:53 Dose:  0.5 mg


Risperidone 1 mg/ Risperidone (0.25 mg)  1.25 mg PO DAILY GABY


   Stop: 19 08:59


   Last Admin: 19 08:44 Dose:  1.25 mg


Trazodone HCl (Desyrel)  50 mg PO HS GABY; Protocol


   Stop: 19 20:59


   Last Admin: 19 20:53 Dose:  50 mg


Zolpidem Tartrate (Ambien)  5 mg PO HS PRN


   PRN Reason: Insomnia


   Stop: 19 00:49


   Last Admin: 19 21:16 Dose:  5 mg








General: demented


HEENT: NC/AT, PERRLA, EOMI


Neck: Supple, No JVD


Lungs: CTAB


Cardiovascular: RRR, Normal S1, Normal S2


Abdomen: soft, non-tender, positive bowel sound


Extremities: excoriation


Neurological: no change





Internal Medicine Assmt/Plan





- Assessment


Assessment: 





ASSESSMENT AND PLAN:  Dehydration, malnutrition, anemia, renal insufficiency. 


generalized weakness








- Plan


Plan: 





PLAN:  


We will provide the patient adequate nutritional support.  


will provide hydration, supplements, sypmtomatic care and treatment.  


We will continue to follow.








Nutritional Asmnt/Malnutr-PDOC





- Dietary Evaluation


Malnutrition Findings (Please click <Entered> for more info): 








Nutritional Asmnt/Malnutrition                             Start:  19 14:

01


Text:                                                      Status: Complete    

  


Freq:                                                                          

  


Protocol:                                                                      

  


 Document     19 14:02  LCHENG  (Rec: 19 14:02  LCHENG  [id number])


 Nutritional Asmnt/Malnutrition


     Patient General Information


      Nutritional Screening                      High Risk


                                                 Consult


      Diagnosis                                  psychosis


      Pertinent Medical Hx/Surgical Hx           no H&P as of this time


      Subjective Information                     Consult received for poor


                                                 appetite. But SHRUTHI Rahman


                                                 reported pt ate well, consumed


                                                 75% breakfast this morning.


                                                 Pt requested strawberry Ensure


                                                 .


      Current Diet Order/ Nutrition Support      regular


      Pertinent Medications                      vit C, theragran


      Pertinent Labs                              reviewed


     Nutritional Hx/Data


      Height                                     1.78 m


      Height (Calculated Centimeters)            177.8


      Current Weight (lbs)                       54.431 kg


      Weight (Calculated Kilograms)              54.4


      Weight (Calculated Grams)                  32853.1


      Ideal Body Weight                          166


      Body Mass Index (BMI)                      17.2


      Weight Status                              Underweight


     GI Symptoms


      GI Symptoms                                None


      Last BM                                    not indicated


      Difficult in:                              None


      Skin Integrity/Comment:                    pressure area to reddened


                                                 sacrume, abrasion to lower


                                                 left extremity


      Current %PO                                Good (%)


     Estimated Nutritional Goals


      BEE in Kcals:                              Using Current wt


      Calories/Kcals/Kg                          27-32


      Kcals Calculated                           7466-4667


      Protein:                                   Using Current wt


      Protein g/k-1.2


      Protein Calculated                         55-66


      Fluid: ml                                  1485-1760ml (1ml/kcal)


     Nutritional Problem


      1. Problem


       Problem                                   underweight


       Etiology                                  possible inadequate energy


                                                 intake


       Signs/Symptoms:                           BMI 17.2


     Intervention/Recommendation


      Comments                                   1. Continue with regular diet


                                                 as ordered. Add Ensure BID (


                                                 strawberry flavor).


                                                 2. Monitor PO intake, wt, labs


                                                 and skin integrity


                                                 3. F/U as low risk in 7 days


     Expected Outcomes/Goals


      Expected Outcomes/Goals                    1. PO intake to meet at least


                                                 75% of nutritional needs.


                                                 2. Wt stability, skin to


                                                 remain intact, labs to


                                                 approach WNL.

## 2019-06-08 NOTE — PROGRESS NOTES
DATE:  06/06/2019



SUBJECTIVE:  The patient seen today 06/06/2019.  The patient is currently in the

hospital.  Alert and oriented to name, place.  He believes the year is 1960, not

quite sure about the month, not quite sure about the year.  The patient remains

disoriented, still believing that the staff is not feeding him, although he

states that he feels full right now.  Still bizarre, distracted, disorganized,

talking nonsense, concerns about his ability to attend to his basic needs.  No

confirmed placement.



PLAN:  We will continue to monitor, titrate meds, initiate a 30-day hold.





DD: 06/06/2019 21:39

DT: 06/06/2019 23:46

JOB# [id number]  [id number]

## 2019-06-08 NOTE — PROGRESS NOTES
DATE:  06/08/2019



SUBJECTIVE:  A 59-year-old male currently in the hospital.  We are pending a

place for him to go.  He is eating better, but remains still confused,

disoriented, making some nonsensical statements, still symptomatic, mostly keeps

to himself.  AO to name only.  Mostly in his room, highly isolative, withdrawn,

likely approaching his baseline.  Tolerant of treatment thus far.  Tolerating to

Risperdal.  I will be continuing to titrate his dose slowly.  We are currently

attempting to confirm a safe discharge plan.  Currently, he is on 30-day hold.





DD: 06/08/2019 06:24

DT: 06/08/2019 21:28

JOB# [id number]  [id number]

## 2019-06-08 NOTE — PROGRESS NOTES
DATE:  06/07/2019



SUBJECTIVE:  The patient remains disoriented, confused, bizarre, likely

approaching his baseline.  No agitation.  No escalation of behaviors.  He seems

still pretty fixated on not getting enough food, although the nursing staff is

feeding him.  He is just very confused, forgetful.



MEDICATIONS:  Noted, good tolerability thus far.



PLAN:  ____ placement has been confirmed.  We will continue to monitor, continue

to titrate medications.





DD: 06/07/2019 17:31

DT: 06/08/2019 13:04

JOB# [id number]  [id number]

## 2019-06-08 NOTE — INTERNAL MEDICINE PROG NOTE
Internal Medicine Subjective





- Subjective


Service Date: 19


Patient is:: awake, verbal, interactive, confused


Per staff patient has:: no adverse event, poor appetite





Internal Medicine Objective





- Results


Recent Labs: 


 Laboratory Last Values











Triglycerides  53 mg/dL (<150)   19  06:10    


 


Cholesterol  115 mg/dL (<200)   19  06:10    


 


LDL Cholesterol Direct  61 mg/dL ()  L  19  06:10    


 


HDL Cholesterol  36 mg/dL (23-92)   19  06:10    














- Physical Exam


Vitals and I&O: 


 Vital Signs











Temp  98.9 F   19 14:00


 


Pulse  85   19 14:00


 


Resp  20   19 14:00


 


BP  112/77   19 14:00


 


Pulse Ox  98   19 14:00








 Intake & Output











 19





 18:59 06:59 18:59


 


Intake Total 860 100 


 


Balance 860 100 


 


Intake:   


 


  Oral 860 100 


 


Other:   


 


  # Voids 3  


 


  # Bowel Movements 1  


 


  Stool Characteristics Soft Soft 





 Formed Formed 











Active Medications: 


Current Medications





Acetaminophen (Tylenol)  650 mg PO Q4HR PRN


   PRN Reason: Mild Pain / Temp above 100


   Stop: 19 00:31


Al Hydrox/Mg Hydrox/Simethicone (Maalox)  30 ml PO Q4HR PRN


   PRN Reason: GI DISTRESS


   Stop: 19 00:31


Ascorbic Acid (Vitamin C)  500 mg PO DAILY GABY


   Stop: 19 08:59


   Last Admin: 19 08:28 Dose:  500 mg


Lorazepam (Ativan)  0.5 mg PO Q4HR PRN; Protocol


   PRN Reason: Anxiety


   Stop: 19 00:49


   Last Admin: 19 08:08 Dose:  0.5 mg


Magnesium Hydroxide (Milk Of Magnesia)  30 ml PO HS PRN


   PRN Reason: Constipation


Multivitamins/Vitamin C (Theragran)  1 tab PO DAILY GABY


   Stop: 19 08:59


   Last Admin: 19 08:27 Dose:  1 tab


Risperidone (Risperdal)  0.5 mg PO HS GABY; Protocol


   Stop: 19 20:59


   Last Admin: 19 20:58 Dose:  0.5 mg


Risperidone (Risperdal)  1.5 mg PO DAILY GABY


   Stop: 19 08:59


   Last Admin: 19 08:27 Dose:  1.5 mg


Trazodone HCl (Desyrel)  50 mg PO HS GABY; Protocol


   Stop: 19 20:59


   Last Admin: 19 20:58 Dose:  50 mg


Zolpidem Tartrate (Ambien)  5 mg PO HS PRN


   PRN Reason: Insomnia


   Stop: 19 00:49


   Last Admin: 19 21:16 Dose:  5 mg








General: demented


HEENT: NC/AT, PERRLA, EOMI


Neck: Supple, No JVD


Lungs: CTAB


Cardiovascular: RRR, Normal S1, Normal S2


Abdomen: soft, non-tender, positive bowel sound


Extremities: excoriation


Neurological: no change





Internal Medicine Assmt/Plan





- Assessment


Assessment: 





ASSESSMENT AND PLAN:  Dehydration, malnutrition, anemia, renal insufficiency. 


generalized weakness











- Plan


Plan: 





We will provide the patient adequate nutritional support.  


will provide hydration, supplements, sypmtomatic care and treatment.  


We will continue to follow.





Nutritional Asmnt/Malnutr-PDOC





- Dietary Evaluation


Malnutrition Findings (Please click <Entered> for more info): 








Nutritional Asmnt/Malnutrition                             Start:  19 14:

01


Text:                                                      Status: Complete    

  


Freq:                                                                          

  


Protocol:                                                                      

  


 Document     19 14:02  LCHENG  (Rec: 19 14:02  LCHENG  [id number])


 Nutritional Asmnt/Malnutrition


     Patient General Information


      Nutritional Screening                      High Risk


                                                 Consult


      Diagnosis                                  psychosis


      Pertinent Medical Hx/Surgical Hx           no H&P as of this time


      Subjective Information                     Consult received for poor


                                                 appetite. But SHRUTHI Rahman


                                                 reported pt ate well, consumed


                                                 75% breakfast this morning.


                                                 Pt requested strawberry Ensure


                                                 .


      Current Diet Order/ Nutrition Support      regular


      Pertinent Medications                      vit C, theragran


      Pertinent Labs                              reviewed


     Nutritional Hx/Data


      Height                                     5 ft 10 in


      Height (Calculated Centimeters)            177.8


      Current Weight (lbs)                       120 lb


      Weight (Calculated Kilograms)              54.4


      Weight (Calculated Grams)                  65759.1


      Ideal Body Weight                          166


      Body Mass Index (BMI)                      17.2


      Weight Status                              Underweight


     GI Symptoms


      GI Symptoms                                None


      Last BM                                    not indicated


      Difficult in:                              None


      Skin Integrity/Comment:                    pressure area to reddened


                                                 sacrume, abrasion to lower


                                                 left extremity


      Current %PO                                Good (%)


     Estimated Nutritional Goals


      BEE in Kcals:                              Using Current wt


      Calories/Kcals/Kg                          27-32


      Kcals Calculated                           5087-0276


      Protein:                                   Using Current wt


      Protein g/k-1.2


      Protein Calculated                         55-66


      Fluid: ml                                  1485-1760ml (1ml/kcal)


     Nutritional Problem


      1. Problem


       Problem                                   underweight


       Etiology                                  possible inadequate energy


                                                 intake


       Signs/Symptoms:                           BMI 17.2


     Intervention/Recommendation


      Comments                                   1. Continue with regular diet


                                                 as ordered. Add Ensure BID (


                                                 strawberry flavor).


                                                 2. Monitor PO intake, wt, labs


                                                 and skin integrity


                                                 3. F/U as low risk in 7 days


     Expected Outcomes/Goals


      Expected Outcomes/Goals                    1. PO intake to meet at least


                                                 75% of nutritional needs.


                                                 2. Wt stability, skin to


                                                 remain intact, labs to


                                                 approach WNL.

## 2019-06-09 NOTE — PROGRESS NOTES
DATE:  06/09/2019



SUBJECTIVE:  The patient remains delusional, odd, talking nonsense and confused.

 He just knows he is in the hospital and his name.  He does not know the year,

the month.  He states, "I need more medicine" likely approaching his baseline.

 Vitals were noted.  No agitation, no escalation of behaviors, needing a higher

level of prompting, redirection.



PLAN:  I will be increasing dosages of Risperdal.  We will continue to monitor.





DD: 06/09/2019 06:38

DT: 06/09/2019 09:12

JOB# [id number]  [id number]

## 2019-06-09 NOTE — INTERNAL MEDICINE PROG NOTE
Internal Medicine Subjective





- Subjective


Service Date: 19


Patient is:: awake, verbal, interactive, confused


Per staff patient has:: no adverse event, poor appetite





Internal Medicine Objective





- Results


Recent Labs: 


 Laboratory Last Values











Triglycerides  53 mg/dL (<150)   19  06:10    


 


Cholesterol  115 mg/dL (<200)   19  06:10    


 


LDL Cholesterol Direct  61 mg/dL ()  L  19  06:10    


 


HDL Cholesterol  36 mg/dL (23-92)   19  06:10    














- Physical Exam


Vitals and I&O: 


 Vital Signs











Temp  99.4 F   19 14:00


 


Pulse  92   19 14:00


 


Resp  18   19 14:00


 


BP  129/84   19 14:00


 


Pulse Ox  96   19 14:00








 Intake & Output











 19





 18:59 06:59 18:59


 


Intake Total 1000 480 


 


Output Total  1 


 


Balance 1000 479 


 


Intake:   


 


  Oral 1000 480 


 


Output:   


 


  Urine/Stool Mix  1 


 


Other:   


 


  # Voids 4 1 


 


  # Bowel Movements 2  











Active Medications: 


Current Medications





Acetaminophen (Tylenol)  650 mg PO Q4HR PRN


   PRN Reason: Mild Pain / Temp above 100


   Stop: 19 00:31


Al Hydrox/Mg Hydrox/Simethicone (Maalox)  30 ml PO Q4HR PRN


   PRN Reason: GI DISTRESS


   Stop: 19 00:31


Ascorbic Acid (Vitamin C)  500 mg PO DAILY GABY


   Stop: 19 08:59


   Last Admin: 19 09:20 Dose:  500 mg


Lorazepam (Ativan)  0.5 mg PO Q4HR PRN; Protocol


   PRN Reason: Anxiety


   Stop: 19 00:49


   Last Admin: 19 08:08 Dose:  0.5 mg


Magnesium Hydroxide (Milk Of Magnesia)  30 ml PO HS PRN


   PRN Reason: Constipation


Multivitamins/Vitamin C (Theragran)  1 tab PO DAILY GABY


   Stop: 19 08:59


   Last Admin: 19 09:20 Dose:  1 tab


Risperidone (Risperdal)  2 mg PO DAILY GABY


   Stop: 19 08:59


   Last Admin: 19 09:20 Dose:  2 mg


Trazodone HCl (Desyrel)  50 mg PO HS GABY; Protocol


   Stop: 19 20:59


   Last Admin: 19 21:23 Dose:  50 mg


Zolpidem Tartrate (Ambien)  5 mg PO HS PRN


   PRN Reason: Insomnia


   Stop: 19 00:49


   Last Admin: 19 21:16 Dose:  5 mg








General: demented


HEENT: NC/AT, PERRLA, EOMI


Neck: Supple, No JVD


Lungs: CTAB


Cardiovascular: RRR, Normal S1, Normal S2


Abdomen: soft, non-tender, positive bowel sound


Extremities: excoriation


Neurological: no change





Internal Medicine Assmt/Plan





- Assessment


Assessment: 





ASSESSMENT AND PLAN:  Dehydration, malnutrition, anemia, renal insufficiency. 


generalized weakness











- Plan


Plan: 





We will provide the patient adequate nutritional support.  


will provide hydration, supplements, sypmtomatic care and treatment.  


We will continue to follow.





Nutritional Asmnt/Malnutr-PDOC





- Dietary Evaluation


Malnutrition Findings (Please click <Entered> for more info): 








Nutritional Asmnt/Malnutrition                             Start:  19 14:

01


Text:                                                      Status: Complete    

  


Freq:                                                                          

  


Protocol:                                                                      

  


 Document     19 14:02  LCHENG  (Rec: 19 14:02  LCHENG  [id number])


 Nutritional Asmnt/Malnutrition


     Patient General Information


      Nutritional Screening                      High Risk


                                                 Consult


      Diagnosis                                  psychosis


      Pertinent Medical Hx/Surgical Hx           no H&P as of this time


      Subjective Information                     Consult received for poor


                                                 appetite. But SHRUTHI Rahman


                                                 reported pt ate well, consumed


                                                 75% breakfast this morning.


                                                 Pt requested strawberry Ensure


                                                 .


      Current Diet Order/ Nutrition Support      regular


      Pertinent Medications                      vit C, theragran


      Pertinent Labs                              reviewed


     Nutritional Hx/Data


      Height                                     5 ft 10 in


      Height (Calculated Centimeters)            177.8


      Current Weight (lbs)                       120 lb


      Weight (Calculated Kilograms)              54.4


      Weight (Calculated Grams)                  60149.1


      Ideal Body Weight                          166


      Body Mass Index (BMI)                      17.2


      Weight Status                              Underweight


     GI Symptoms


      GI Symptoms                                None


      Last BM                                    not indicated


      Difficult in:                              None


      Skin Integrity/Comment:                    pressure area to reddened


                                                 sacrume, abrasion to lower


                                                 left extremity


      Current %PO                                Good (%)


     Estimated Nutritional Goals


      BEE in Kcals:                              Using Current wt


      Calories/Kcals/Kg                          27-32


      Kcals Calculated                           1644-5931


      Protein:                                   Using Current wt


      Protein g/k-1.2


      Protein Calculated                         55-66


      Fluid: ml                                  1485-1760ml (1ml/kcal)


     Nutritional Problem


      1. Problem


       Problem                                   underweight


       Etiology                                  possible inadequate energy


                                                 intake


       Signs/Symptoms:                           BMI 17.2


     Intervention/Recommendation


      Comments                                   1. Continue with regular diet


                                                 as ordered. Add Ensure BID (


                                                 strawberry flavor).


                                                 2. Monitor PO intake, wt, labs


                                                 and skin integrity


                                                 3. F/U as low risk in 7 days


     Expected Outcomes/Goals


      Expected Outcomes/Goals                    1. PO intake to meet at least


                                                 75% of nutritional needs.


                                                 2. Wt stability, skin to


                                                 remain intact, labs to


                                                 approach WNL.

## 2019-06-10 NOTE — INTERNAL MEDICINE PROG NOTE
Internal Medicine Subjective





- Subjective


Patient seen and examined:: with staff, chart reviewed


Patient is:: awake, verbal, interactive, confused


Per staff patient has:: no adverse event, poor appetite





Internal Medicine Objective





- Results


Recent Labs: 


 Laboratory Last Values











Triglycerides  53 mg/dL (<150)   19  06:10    


 


Cholesterol  115 mg/dL (<200)   19  06:10    


 


LDL Cholesterol Direct  61 mg/dL ()  L  19  06:10    


 


HDL Cholesterol  36 mg/dL (23-92)   19  06:10    














- Physical Exam


Vitals and I&O: 


 Vital Signs











Temp  97.6 F   06/10/19 06:07


 


Pulse  74   06/10/19 06:07


 


Resp  19   06/10/19 08:00


 


BP  107/65   06/10/19 06:07


 


Pulse Ox  92   06/10/19 06:07








 Intake & Output











 06/09/19 06/10/19 06/10/19





 18:59 06:59 18:59


 


Intake Total 1200 240 


 


Balance 1200 240 


 


Intake:   


 


  Oral 1200 240 


 


Other:   


 


  # Voids 5 2 


 


  # Bowel Movements 2  


 


  Stool Characteristics  Soft 





  Formed 











Active Medications: 


Current Medications





Acetaminophen (Tylenol)  650 mg PO Q4HR PRN


   PRN Reason: Mild Pain / Temp above 100


   Stop: 19 00:31


Al Hydrox/Mg Hydrox/Simethicone (Maalox)  30 ml PO Q4HR PRN


   PRN Reason: GI DISTRESS


   Stop: 19 00:31


Ascorbic Acid (Vitamin C)  500 mg PO DAILY GABY


   Stop: 19 08:59


   Last Admin: 06/10/19 09:01 Dose:  500 mg


Lorazepam (Ativan)  0.5 mg PO Q4HR PRN; Protocol


   PRN Reason: Anxiety


   Stop: 19 00:49


   Last Admin: 06/10/19 09:01 Dose:  0.5 mg


Magnesium Hydroxide (Milk Of Magnesia)  30 ml PO HS PRN


   PRN Reason: Constipation


Multivitamins/Vitamin C (Theragran)  1 tab PO DAILY GABY


   Stop: 19 08:59


   Last Admin: 06/10/19 09:01 Dose:  1 tab


Risperidone (Risperdal)  2 mg PO DAILY GABY


   Stop: 19 08:59


   Last Admin: 06/10/19 09:01 Dose:  2 mg


Trazodone HCl (Desyrel)  50 mg PO HS GABY; Protocol


   Stop: 19 20:59


   Last Admin: 19 20:52 Dose:  50 mg


Zolpidem Tartrate (Ambien)  5 mg PO HS PRN


   PRN Reason: Insomnia


   Stop: 19 00:49


   Last Admin: 19 20:52 Dose:  5 mg








General: demented


HEENT: NC/AT, PERRLA, EOMI


Neck: Supple, No JVD


Lungs: CTAB


Cardiovascular: RRR, Normal S1, Normal S2


Abdomen: soft, non-tender, positive bowel sound


Extremities: excoriation


Neurological: no change





Internal Medicine Assmt/Plan





- Assessment


Assessment: 





ASSESSMENT AND PLAN:  Dehydration, malnutrition, anemia, renal insufficiency. 


generalized weakness








- Plan


Plan: 





PLAN:  


We will provide the patient adequate nutritional support.  


will provide hydration, supplements, sypmtomatic care and treatment.  


We will continue to follow.








Nutritional Asmnt/Malnutr-PDOC





- Dietary Evaluation


Malnutrition Findings (Please click <Entered> for more info): 








Nutritional Asmnt/Malnutrition                             Start:  19 14:

01


Text:                                                      Status: Complete    

  


Freq:                                                                          

  


Protocol:                                                                      

  


 Document     19 14:02  LCHENG  (Rec: 19 14:02  LCHENG  [id number])


 Nutritional Asmnt/Malnutrition


     Patient General Information


      Nutritional Screening                      High Risk


                                                 Consult


      Diagnosis                                  psychosis


      Pertinent Medical Hx/Surgical Hx           no H&P as of this time


      Subjective Information                     Consult received for poor


                                                 appetite. But SHRUTHI Rahman


                                                 reported pt ate well, consumed


                                                 75% breakfast this morning.


                                                 Pt requested strawberry Ensure


                                                 .


      Current Diet Order/ Nutrition Support      regular


      Pertinent Medications                      vit C, theragran


      Pertinent Labs                              reviewed


     Nutritional Hx/Data


      Height                                     1.78 m


      Height (Calculated Centimeters)            177.8


      Current Weight (lbs)                       54.431 kg


      Weight (Calculated Kilograms)              54.4


      Weight (Calculated Grams)                  42826.1


      Ideal Body Weight                          166


      Body Mass Index (BMI)                      17.2


      Weight Status                              Underweight


     GI Symptoms


      GI Symptoms                                None


      Last BM                                    not indicated


      Difficult in:                              None


      Skin Integrity/Comment:                    pressure area to reddened


                                                 sacrume, abrasion to lower


                                                 left extremity


      Current %PO                                Good (%)


     Estimated Nutritional Goals


      BEE in Kcals:                              Using Current wt


      Calories/Kcals/Kg                          27-32


      Kcals Calculated                           1370-0651


      Protein:                                   Using Current wt


      Protein g/k-1.2


      Protein Calculated                         55-66


      Fluid: ml                                  1485-1760ml (1ml/kcal)


     Nutritional Problem


      1. Problem


       Problem                                   underweight


       Etiology                                  possible inadequate energy


                                                 intake


       Signs/Symptoms:                           BMI 17.2


     Intervention/Recommendation


      Comments                                   1. Continue with regular diet


                                                 as ordered. Add Ensure BID (


                                                 strawberry flavor).


                                                 2. Monitor PO intake, wt, labs


                                                 and skin integrity


                                                 3. F/U as low risk in 7 days


     Expected Outcomes/Goals


      Expected Outcomes/Goals                    1. PO intake to meet at least


                                                 75% of nutritional needs.


                                                 2. Wt stability, skin to


                                                 remain intact, labs to


                                                 approach WNL.

## 2019-06-10 NOTE — DISCHARGE SUMMARY
DATE OF DISCHARGE:     06/10/2019



JUSTIFICATION FOR HOSPITALIZATION:  Decompensation, psychosis, confusion and

disorientation.



DISCHARGE SUMMARY:   A 59-year-old male transferred from [facility]

on a hold, had been catatonic there, not talking, not eating, not taking

medications, accepting IV medications with improvement.  P.o. intake remained

poor, bizarre, psychotic, ruminative, nonsensical, believing staff was not

feeding him when they were.



PAST PSYCHIATRIC HISTORY:  Likely schizophrenia or bipolar.



SOCIAL HISTORY:  Noted.



MEDICATIONS:  Noted.



MENTAL STATUS EXAMINATION:  Please see full psych eval for details.



MENTAL HOSPITAL COURSE:  After initial assessment, the patient remained

confused, disoriented, __and__ making some odd statements, believing he was not

being fed.  As hospitalization course progressed, staff noting that he did take

his medications and was eating better, was less obsessive about not being fed

and staff was able to feed him, able to make some of his needs known.  No

agitation toward latter __end__ of treatment, he was likely his baseline, 
confused,

eating okay % of intake.  Vitals were noted.



CONDITION UPON DISCHARGE:  Improved, allowing ADLs, brighter affect, confused,

disoriented, eating well.  No SI, no HI, no overt psychosis.



PROVISIONAL DIAGNOSIS:  Bipolar, rule out schizophrenia.



Under medical, please see full H and P.



PROGNOSIS:  The patient follows up with outpatient mental health services and

remains compliant with treatment.  Prognosis will improve, otherwise guarded.





DD: 06/10/2019 11:30

DT: 06/10/2019 18:08

JOB# [id number]  [id number]

[facility]

## 2019-08-12 ENCOUNTER — HOSPITAL ENCOUNTER (INPATIENT)
Dept: HOSPITAL 36 - ER | Age: 59
LOS: 14 days | Discharge: NURSING, REHAB OR LONG-TERM CARE | DRG: 885 | End: 2019-08-26
Attending: PSYCHIATRY & NEUROLOGY | Admitting: PSYCHIATRY & NEUROLOGY
Payer: MEDICARE

## 2019-08-12 VITALS — DIASTOLIC BLOOD PRESSURE: 78 MMHG | SYSTOLIC BLOOD PRESSURE: 117 MMHG

## 2019-08-12 DIAGNOSIS — I10: ICD-10-CM

## 2019-08-12 DIAGNOSIS — F25.9: Primary | ICD-10-CM

## 2019-08-12 DIAGNOSIS — E88.09: ICD-10-CM

## 2019-08-12 DIAGNOSIS — D64.9: ICD-10-CM

## 2019-08-12 DIAGNOSIS — E86.0: ICD-10-CM

## 2019-08-12 DIAGNOSIS — F29: ICD-10-CM

## 2019-08-12 DIAGNOSIS — Z59.0: ICD-10-CM

## 2019-08-12 DIAGNOSIS — N28.9: ICD-10-CM

## 2019-08-12 DIAGNOSIS — E87.1: ICD-10-CM

## 2019-08-12 LAB
ALBUMIN SERPL-MCNC: 3.6 GM/DL (ref 4.2–5.5)
ALBUMIN/GLOB SERPL: 1.3 {RATIO} (ref 1–1.8)
ALP SERPL-CCNC: 38 U/L (ref 34–104)
ALT SERPL-CCNC: 13 U/L (ref 7–52)
ANION GAP SERPL CALC-SCNC: 8.1 MMOL/L (ref 7–16)
AST SERPL-CCNC: 16 U/L (ref 13–39)
BASOPHILS # BLD AUTO: 0.1 TH/CUMM (ref 0–0.2)
BASOPHILS NFR BLD AUTO: 1.1 % (ref 0–2)
BILIRUB SERPL-MCNC: 0.4 MG/DL (ref 0.3–1)
BUN SERPL-MCNC: 27 MG/DL (ref 7–25)
CALCIUM SERPL-MCNC: 9.7 MG/DL (ref 8.6–10.3)
CHLORIDE SERPL-SCNC: 102 MEQ/L (ref 98–107)
CHOLEST SERPL-MCNC: 144 MG/DL (ref ?–200)
CO2 SERPL-SCNC: 26.4 MEQ/L (ref 21–31)
CREAT SERPL-MCNC: 1.1 MG/DL (ref 0.7–1.3)
EOSINOPHIL # BLD AUTO: 0.6 TH/CMM (ref 0.1–0.4)
EOSINOPHIL NFR BLD AUTO: 5.9 % (ref 0–5)
ERYTHROCYTE [DISTWIDTH] IN BLOOD BY AUTOMATED COUNT: 15.7 % (ref 11.5–20)
GLOBULIN SER-MCNC: 2.8 GM/DL
GLUCOSE SERPL-MCNC: 96 MG/DL (ref 70–105)
HCT VFR BLD CALC: 35 % (ref 41–60)
HDLC SERPL-MCNC: 50 MG/DL (ref 23–92)
HGB BLD-MCNC: 11.6 GM/DL (ref 12–16)
LYMPHOCYTE AB SER FC-ACNC: 2.2 TH/CMM (ref 1.5–3)
LYMPHOCYTES NFR BLD AUTO: 21.7 % (ref 20–50)
MCH RBC QN AUTO: 28.9 PG (ref 26–30)
MCHC RBC AUTO-ENTMCNC: 33 PG (ref 28–36)
MCV RBC AUTO: 87.6 FL (ref 80–99)
MONOCYTES # BLD AUTO: 0.8 TH/CMM (ref 0.3–1)
MONOCYTES NFR BLD AUTO: 7.4 % (ref 2–10)
NEUTROPHILS # BLD: 6.6 TH/CMM (ref 1.8–8)
NEUTROPHILS NFR BLD AUTO: 63.9 % (ref 40–80)
PLATELET # BLD: 335 TH/CMM (ref 150–400)
POTASSIUM SERPL-SCNC: 4.5 MEQ/L (ref 3.5–5.1)
RBC # BLD AUTO: 4 MIL/CMM (ref 4.3–5.7)
SODIUM SERPL-SCNC: 132 MEQ/L (ref 136–145)
TRIGL SERPL-MCNC: 86 MG/DL (ref ?–150)
WBC # BLD AUTO: 10.3 TH/CMM (ref 4.8–10.8)

## 2019-08-12 PROCEDURE — Z7610: HCPCS

## 2019-08-12 PROCEDURE — X3904: HCPCS

## 2019-08-12 RX ADMIN — HALOPERIDOL LACTATE SCH MG: 2 SOLUTION, CONCENTRATE ORAL at 21:51

## 2019-08-12 SDOH — ECONOMIC STABILITY - HOUSING INSECURITY: HOMELESSNESS: Z59.0

## 2019-08-12 NOTE — HISTORY & PHYSICAL
ADMIT DATE:  08/12/2019



CHIEF COMPLAINT:  Increasing agitation and aggressive behavior at eating.



HISTORY OF PRESENT ILLNESS:  This is a 59-year-old  male with history

of psych disorder, homelessness, admitted from nursing facility secondary to

above behavior under the service of Dr. Che.  The patient is agitated,

trying to hit the ER physician.  The patient denies chest pain or shortness of

breath.



PAST MEDICAL HISTORY:  As mentioned in history of present illness.



PAST SURGICAL HISTORY:  Denies surgeries in the past.  The patient has oral

ulcer disease accordingly.



ALLERGIES:  No known drug allergies.



MEDICATIONS:  The patient is on trazodone, ___, magnesium, lorazepam, iron,

Colace, Coumadin, ascorbic acid, Tylenol.



FAMILY HISTORY:  Noncontributory.



SOCIAL HISTORY:  The patient is a ____, the patient used to smoke 1-2 packs per

day, used to drink, or intravenous drug use.  The patient did some labor work,

, no children.



REVIEW OF SYSTEMS:

GENERAL:  Complains not feeling well.

HEENT:  No blurred vision or pain.

LUNGS:  The patient was diagnosed with COPD.  Denies asthma.

HEART:  The patient with hypertension.  Denies coronary artery disease.

ABDOMEN:  No mass or pain.

GENITOURINARY:  The patient denies increased frequency or dysuria.

NEUROLOGIC:  No headache, seizure or syncope.

PSYCHIATRIC:  Denies.



PHYSICAL EXAMINATION:

VITAL SIGNS:  Blood pressure 170/70, respirations 18, pulse 79, heart rate 78.

GENERAL:  Elderly male who appears his stated age.

NECK:  Supple.  No mass.

LUNGS:  Equal breath sounds, few rhonchi.

HEART:  Regular rate and rhythm with systolic ejection murmur.

ABDOMEN:  Soft, globular.

EXTREMITIES:  Positive excoriations.

NEUROLOGIC:  Limited.



LABORATORY DATA:  WBC 10, hemoglobin 11, platelets 352.  Sodium 139, potassium

4.5, BUN 27, creatinine 1.1, blood sugar 96, albumin 3.6.  UA is pending.



ASSESSMENT AND PLAN:

1.  Hypertension.

2.  Dehydration.

3.  Renal insufficiency.

4.  Thin built.

5.  History of agitation and psych disorder.

6.  Anemia.

7.  Hyponatremia.

8.  Low albumin.



PLAN:  Continue oxygen, bronchodilator treatments.  We will provide the patient

with adequate support with antihypertensive medication.  We will monitor

hemoglobin and hematocrit.  We will review outpatient basis.  We will continue

monitoring the patient closely.





DD: 08/12/2019 14:29

DT: 08/12/2019 14:58

[facility]# [id number]  [id number]

## 2019-08-12 NOTE — ED PHYSICIAN CHART
ED Chief Complaint/HPI





- Patient Information


Date Seen:: 08/12/19


Time Seen:: 12:30


Chief Complaint:: aggresive


Allergies:: 


 Allergies











Allergy/AdvReac Type Severity Reaction Status Date / Time


 


No Known Allergies Allergy   Verified 05/21/19 21:54











Vitals:: 


 Vital Signs - 8 hr











  08/12/19





  12:16


 


Temp 97.8 F


 


HR 79


 


RR 18


 


/78


 


O2 Sat % 98











Historian:: EMS, Medical Records


Review:: Nurse's Note Reviewed, EMS run form Reviewed, Transfer documents 

Reviewed, Patient unable to respond (change in behavior on lithium)





ED Review of Systems





- Review of Systems


General/Constitutional: No fever





ED Past Medical History





- Past Medical History


Past Medical History: Other (psyche)





Family Medical History





- Family Member


  ** Mother


History Unknown: Yes





ED Physical Exam





- Physical Examination


General/Constitutional: Non-toxic appearing


Head: Atraumatic


Eyes: Lids, conjuctiva normal


Skin: Nl inspection


ENMT: External ears, nose nl


Neck: Nontender


Respiratory: Clear to Auscultation (diminished breath sounds)


Cardio Vascular: RRR


GI: No tenderness/rebounding/guarding (altered mental)





ED Labs/Radiology/EKG Results





- Lab Results


Results: 


 Laboratory Tests











  08/12/19 08/12/19





  12:35 12:35


 


WBC  10.3 


 


RBC  4.00 L 


 


Hgb  11.6 L 


 


Hct  35.0 L 


 


MCV  87.6 


 


MCH  28.9 


 


MCHC Differential  33.0 


 


RDW  15.7 


 


Plt Count  335 


 


MPV  8.2 


 


Neutrophils %  63.9 


 


Lymphocytes %  21.7 


 


Monocytes %  7.4 


 


Eosinophils %  5.9 H 


 


Basophils %  1.1 


 


Sodium   132 L


 


Potassium   4.5


 


Chloride   102


 


Carbon Dioxide   26.4


 


Anion Gap   8.1


 


BUN   27 H


 


Creatinine   1.1


 


Est GFR ( Amer)   > 60.0


 


Est GFR (Non-Af Amer)   > 60.0


 


BUN/Creatinine Ratio   24.5


 


Glucose   96


 


Calcium   9.7


 


Total Bilirubin   0.4


 


AST   16


 


ALT   13


 


Alkaline Phosphatase   38


 


Total Protein   6.4


 


Albumin   3.6 L


 


Globulin   2.8


 


Albumin/Globulin Ratio   1.3














ED Assessment





- Assessment


General Assessment: 





psychotic break





ED Septic Shock





- .


Is Septic Shock (SBP<90, OR Lactate>4 mmol\L) present?: No





- <6hrs of presentation:


Vital Signs: 


 Vital Signs - 8 hr











  08/12/19





  12:16


 


Temp 97.8 F


 


HR 79


 


RR 18


 


/78


 


O2 Sat % 98














ED Reassessment (Disposition)





- Reassessment


Reassessment Condition:: Unchanged





- Patient Disposition


Discharge/Transfer:: Acute Care w/in this hosp (admit to psyche)

## 2019-08-12 NOTE — PSYCHIATRIC EVALUATION
DATE OF SERVICE:  08/12/2019



CHIEF COMPLAINT:  A 59-year-old male, currently in the Emergency Room coming

from a skilled nursing facility, apparently was unruly, concerns for lithium

toxicity, but refusing labs.  Unable to be cared for at that level of care.  The

patient is a very poor historian.  In fact, refusing to speak with me.  He is

stating "Get away, get away, you are not my doctor."  Otherwise, he has been

calm in the Emergency Room.



PAST PSYCHIATRIC HISTORY:  Multiple admissions in the past.  I have spoken with

the patient's family over the phone in the past as well, confirming his long

psychiatric history.  During his last hospitalization, he was refusing all care

and treatment, and I had to file a [employer] petition, as he was refusing

medications and was extremely aggressive and irritable and agitated, spitting,

for example.



SOCIAL HISTORY:  He is staying at a skilled nursing.  Family does visit him and

is involved from time to time.



MEDICATIONS:  Noted.



MENTAL STATUS EXAMINATION:  Stated age.  Fair eye contact, not really saying

much to me, irritable, guarded.  Seems disorganized, unclear psychotic symptoms,

unclear SI or HI.  Poor insight, very poor impulse control.



PROVISIONAL DIAGNOSES:  Schizophrenia, rule out schizoaffective disorder.



MEDICAL:  Please see full H and P.



RECOMMENDATIONS AND PLAN:  We will continue to monitor alongside the primary

medical team.  Recommend Geropsych placement, if behaviors persist.





DD: 08/12/2019 16:33

DT: 08/12/2019 22:50

[facility]# [id number]  [id number]

## 2019-08-13 LAB — HBA1C BLD-MCNC: 5.6 % (ref 4.8–5.6)

## 2019-08-13 RX ADMIN — CLONIDINE SCH: 0.1 PATCH TRANSDERMAL at 10:42

## 2019-08-13 RX ADMIN — HALOPERIDOL LACTATE SCH MG: 2 SOLUTION, CONCENTRATE ORAL at 08:38

## 2019-08-13 RX ADMIN — HALOPERIDOL LACTATE SCH MG: 2 SOLUTION, CONCENTRATE ORAL at 20:46

## 2019-08-13 RX ADMIN — HALOPERIDOL LACTATE SCH: 2 SOLUTION, CONCENTRATE ORAL at 14:48

## 2019-08-13 NOTE — INTERNAL MEDICINE PROG NOTE
Internal Medicine Subjective





- Subjective


Patient seen and examined:: with staff, chart reviewed


Patient is:: awake, verbal, interactive, agitated, confused


Per staff patient has:: no adverse event, no episodes of fall, poor appetite, 

confused, tolerating meds





Internal Medicine Objective





- Results


Result Diagrams: 


 19 12:35





 19 12:35


Recent Labs: 


 Laboratory Last Values











WBC  10.3 Th/cmm (4.8-10.8)   19  12:35    


 


RBC  4.00 Mil/cmm (4.30-5.70)  L  19  12:35    


 


Hgb  11.6 gm/dL (12-16)  L  19  12:35    


 


Hct  35.0 % (41.0-60)  L  19  12:35    


 


MCV  87.6 fl (80-99)   19  12:35    


 


MCH  28.9 pg (26.0-30.0)   19  12:35    


 


MCHC Differential  33.0 pg (28.0-36.0)   19  12:35    


 


RDW  15.7 % (11.5-20.0)   19  12:35    


 


Plt Count  335 Th/cmm (150-400)   19  12:35    


 


MPV  8.2 fl  19  12:35    


 


Neutrophils %  63.9 % (40.0-80.0)   19  12:35    


 


Lymphocytes %  21.7 % (20.0-50.0)   19  12:35    


 


Monocytes %  7.4 % (2.0-10.0)   19  12:35    


 


Eosinophils %  5.9 % (0.0-5.0)  H  19  12:35    


 


Basophils %  1.1 % (0.0-2.0)   19  12:35    


 


Sodium  132 mEq/L (136-145)  L  19  12:35    


 


Potassium  4.5 mEq/L (3.5-5.1)   19  12:35    


 


Chloride  102 mEq/L ()   19  12:35    


 


Carbon Dioxide  26.4 mEq/L (21.0-31.0)   19  12:35    


 


Anion Gap  8.1  (7.0-16.0)   19  12:35    


 


BUN  27 mg/dL (7-25)  H  19  12:35    


 


Creatinine  1.1 mg/dL (0.7-1.3)   19  12:35    


 


Est GFR ( Amer)  > 60.0 ml/min (>90)   19  12:35    


 


Est GFR (Non-Af Amer)  > 60.0 ml/min  19  12:35    


 


BUN/Creatinine Ratio  24.5   19  12:35    


 


Glucose  96 mg/dL ()   19  12:35    


 


Calcium  9.7 mg/dL (8.6-10.3)   19  12:35    


 


Total Bilirubin  0.4 mg/dL (0.3-1.0)   19  12:35    


 


AST  16 U/L (13-39)   19  12:35    


 


ALT  13 U/L (7-52)   19  12:35    


 


Alkaline Phosphatase  38 U/L ()   19  12:35    


 


Total Protein  6.4 gm/dL (6.0-8.3)   19  12:35    


 


Albumin  3.6 gm/dL (4.2-5.5)  L  19  12:35    


 


Globulin  2.8 gm/dL  19  12:35    


 


Albumin/Globulin Ratio  1.3  (1.0-1.8)   19  12:35    


 


Triglycerides  86 mg/dL (<150)   19  12:35    


 


Cholesterol  144 mg/dL (<200)   19  12:35    


 


LDL Cholesterol Direct  81 mg/dL ()   19  12:35    


 


HDL Cholesterol  50 mg/dL (23-92)   19  12:35    


 


Lithium  1.18 mmol/L (0.5-1.0)  H  19  12:35    














- Physical Exam


Vitals and I&O: 


 Vital Signs











Temp  98.7 F   19 06:35


 


Pulse  64   19 06:35


 


Resp  18   19 06:35


 


BP  109/68   19 06:35


 


Pulse Ox  98   19 06:35








 Intake & Output











 19





 18:59 06:59 18:59


 


Weight (lbs) 49.895 kg 49.895 kg 


 


Other:   


 


  # Voids  3 


 


  # Bowel Movements  1 


 


  Weight Source Estimated Bedscale 











Active Medications: 


Current Medications





Acetaminophen (Tylenol)  650 mg PO Q6H PRN


   PRN Reason: Pain / Temp above 100


   Stop: 10/11/19 14:24


Al Hydrox/Mg Hydrox/Simethicone (Maalox)  30 ml PO Q4H PRN


   PRN Reason: GI DISTRESS


   Stop: 10/11/19 14:24


Ascorbic Acid (Vitamin C)  500 mg PO DAILY GABY


   Stop: 10/12/19 08:59


   Last Admin: 19 08:37 Dose:  500 mg


Clonidine HCl (Catapres-Tts-1)  1 patch TD QTUE GABY


   Stop: 10/12/19 08:59


   Last Admin: 19 10:42 Dose:  Not Given


Docusate Sodium (Colace)  100 mg PO DAILY GABY


   Stop: 10/12/19 08:59


   Last Admin: 19 08:37 Dose:  100 mg


Ferrous Sulfate (Iron)  325 mg PO DAILY GABY


   Stop: 10/12/19 08:59


   Last Admin: 19 08:37 Dose:  325 mg


Haloperidol Lactate (Haldol)  2 mg PO TID GABY


   Stop: 10/11/19 20:59


   Last Admin: 19 08:38 Dose:  2 mg


Lorazepam (Ativan)  0.5 mg PO Q6H PRN; Protocol


   PRN Reason: Agitation


   Stop: 10/11/19 14:24


Magnesium Hydroxide (Milk Of Magnesia)  30 ml PO HS PRN


   PRN Reason: Constipation


   Stop: 10/11/19 14:24


Multivitamins/Vitamin C (Theragran)  1 tab PO DAILY GABY


   Stop: 10/12/19 08:59


   Last Admin: 19 08:37 Dose:  1 tab


Trazodone HCl (Desyrel)  50 mg PO HS GABY; Protocol


   Stop: 10/11/19 20:59


   Last Admin: 19 21:51 Dose:  50 mg








General: demented, thin, appears older


HEENT: NC/AT, PERRLA, EOMI


Neck: Supple, No JVD, No thyromegaly, No LAD


Lungs: CTAB


Cardiovascular: RRR, Normal S1, Normal S2


Abdomen: soft, non-tender, thin, non-distended, positive bowel sound


Extremities: excoriation, contracture





Internal Medicine Assmt/Plan





- Assessment


Assessment: 





ASSESSMENT AND PLAN:


1.  Hypertension.


2.  Dehydration.


3.  Renal insufficiency.


4.  Thin built.


5.  History of agitation and psych disorder.


6.  Anemia.


7.  Hyponatremia.


8.  Low albumin.











- Plan


Plan: 








PLAN:  Continue oxygen, bronchodilator treatments.  We will provide the patient


with adequate support with antihypertensive medication.  We will monitor


hemoglobin and hematocrit.  We will review mes  We will continue


monitoring the patient closely.





Nutritional Asmnt/Malnutr-PDOC





- Dietary Evaluation


Malnutrition Findings (Please click <Entered> for more info): 








Nutritional Asmnt/Malnutrition                             Start:  19 11:

29


Text:                                                      Status: Complete    

  


Freq:                                                                          

  


Protocol:                                                                      

  


 Document     19 11:29  JUSTINE  (Rec: 19 11:33  JUSTINE [id number])


 Nutritional Asmnt/Malnutrition


     Patient General Information


      Nutritional Screening                      High Risk


      Diagnosis                                  Psychosis


      Pertinent Medical Hx/Surgical Hx           Psych Disorder, oral ulcer


                                                 disease, COPD


      Subjective Information                     Pt is a 59-year-old male


                                                 admitted on  d/t


                                                 aggressiveness. RN, Vincenzo,


                                                 stated Pt ate well this


                                                 morning, 70% meal and seemed


                                                 to be in a good mood. As Pt is


                                                 underweight and has refused


                                                 meals d/t psychosis in


                                                 previous hospital visits,


                                                 adding Ensure Enlive TID to


                                                 encourage weight gain trending


                                                 towards IBW.


                                                 HT: 510


                                                 WT: 110 LB (50 kg)


                                                 BMI: 15.79 (Underweight)


                                                 GI: Not noted


                                                 BM: Not Noted


                                                 I/O: Not Noted


                                                 Skin: Not noted


                                                 Clifford: 17


                                                 Diet Order: Cardiac, chopped


                                                 Estimated Energy Needs: (


                                                 Underweight, CBW)


                                                 2185-4737 kcals (30-35 kcals/


                                                 kg)


                                                 50-60g Pro (1.0-1.2 g/kg)


                                                 3581-3743 ml (35-40 ml/kg)


      Current Diet Order/ Nutrition Support      Cardiac, chopped


      Pertinent Medications                      Maalox (PRN), Vitamin C,


                                                 Colace, Ferrous Sulfate, MOM (


                                                 PRN), Theregran


      Pertinent Labs                             : Hgb/Hct 11.6/35.0, Na


                                                 132, BUN/Cr 27/1.1, Alb 3.6


     Nutritional Hx/Data


      Height                                     1.78 m


      Height (Calculated Centimeters)            177.8


      Current Weight (lbs)                       49.895 kg


      Weight (Calculated Kilograms)              49.9


      Weight (Calculated Grams)                  75870.2


      Ideal Body Weight                          166 LB (75.45kg)


      % Ideal Body Weight                        66


      Body Mass Index (BMI)                      15.7


      Weight Status                              Underweight


     GI Symptoms


      Last BM                                    Not noted


      Skin Integrity/Comment:                    Clifford: 17


      Current %PO                                Fair (50-74%)


     Estimated Nutritional Goals


      BEE in Kcals:                              Using Current wt


      Calories/Kcals/Kg                          30-35


      Kcals Calculated                           1428-1397


      Protein:                                   Using Current wt


      Protein g/k.0-1.2


      Protein Calculated                         50-60


      Fluid: ml                                  7473-4299 ml (35-40 ml/kg)


     Nutritional Problem


      1. Problem


       Problem                                   Underweight


       Etiology                                  r/t psychosis


       Signs/Symptoms:                           aeb Pt past behavior of


                                                 refusing meals for consecutive


                                                 days.


     Malnutrition Related to Morbid Obesity


      Malnutrition related to morbid obesity     No


     Intervention/Recommendation


      Comments                                   1. Continue with Cardiac,


                                                 chopped diet as ordered.


                                                 2. Add Ensure Enlive TID (


                                                 completed).


     Expected Outcomes/Goals


      Expected Outcomes/Goals                    1. Continue with Cardiac,


                                                 chopped diet as ordered.


                                                 2. Add Ensure Enlive TID (


                                                 completed).


                                                 3. PO intake to meet 75% of


                                                 nutritional needs.


                                                 4. Monitor PO intake, wt,


                                                 nutrition related labs, and


                                                 skin integrity.


                                                 5. F/U as moderate risk in 3-5


                                                 days, 8/15-

## 2019-08-14 RX ADMIN — HALOPERIDOL LACTATE SCH MG: 2 SOLUTION, CONCENTRATE ORAL at 20:35

## 2019-08-14 RX ADMIN — HALOPERIDOL LACTATE SCH MG: 2 SOLUTION, CONCENTRATE ORAL at 13:05

## 2019-08-14 RX ADMIN — HALOPERIDOL LACTATE SCH MG: 2 SOLUTION, CONCENTRATE ORAL at 08:48

## 2019-08-14 NOTE — INTERNAL MEDICINE PROG NOTE
Internal Medicine Subjective





- Subjective


Patient seen and examined:: with staff, chart reviewed


Patient is:: awake, verbal, interactive, agitated, confused


Per staff patient has:: no adverse event, no episodes of fall, poor appetite, 

confused, tolerating meds





Internal Medicine Objective





- Results


Result Diagrams: 


 19 12:35





 19 12:35


Recent Labs: 


 Laboratory Last Values











WBC  10.3 Th/cmm (4.8-10.8)   19  12:35    


 


RBC  4.00 Mil/cmm (4.30-5.70)  L  19  12:35    


 


Hgb  11.6 gm/dL (12-16)  L  19  12:35    


 


Hct  35.0 % (41.0-60)  L  19  12:35    


 


MCV  87.6 fl (80-99)   19  12:35    


 


MCH  28.9 pg (26.0-30.0)   19  12:35    


 


MCHC Differential  33.0 pg (28.0-36.0)   19  12:35    


 


RDW  15.7 % (11.5-20.0)   19  12:35    


 


Plt Count  335 Th/cmm (150-400)   19  12:35    


 


MPV  8.2 fl  19  12:35    


 


Neutrophils %  63.9 % (40.0-80.0)   19  12:35    


 


Lymphocytes %  21.7 % (20.0-50.0)   19  12:35    


 


Monocytes %  7.4 % (2.0-10.0)   19  12:35    


 


Eosinophils %  5.9 % (0.0-5.0)  H  19  12:35    


 


Basophils %  1.1 % (0.0-2.0)   19  12:35    


 


Sodium  132 mEq/L (136-145)  L  19  12:35    


 


Potassium  4.5 mEq/L (3.5-5.1)   19  12:35    


 


Chloride  102 mEq/L ()   19  12:35    


 


Carbon Dioxide  26.4 mEq/L (21.0-31.0)   19  12:35    


 


Anion Gap  8.1  (7.0-16.0)   19  12:35    


 


BUN  27 mg/dL (7-25)  H  19  12:35    


 


Creatinine  1.1 mg/dL (0.7-1.3)   19  12:35    


 


Est GFR ( Amer)  > 60.0 ml/min (>90)   19  12:35    


 


Est GFR (Non-Af Amer)  > 60.0 ml/min  19  12:35    


 


BUN/Creatinine Ratio  24.5   19  12:35    


 


Glucose  96 mg/dL ()   19  12:35    


 


Calcium  9.7 mg/dL (8.6-10.3)   19  12:35    


 


Total Bilirubin  0.4 mg/dL (0.3-1.0)   19  12:35    


 


AST  16 U/L (13-39)   19  12:35    


 


ALT  13 U/L (7-52)   19  12:35    


 


Alkaline Phosphatase  38 U/L ()   19  12:35    


 


Total Protein  6.4 gm/dL (6.0-8.3)   19  12:35    


 


Albumin  3.6 gm/dL (4.2-5.5)  L  19  12:35    


 


Globulin  2.8 gm/dL  19  12:35    


 


Albumin/Globulin Ratio  1.3  (1.0-1.8)   19  12:35    


 


Triglycerides  86 mg/dL (<150)   19  12:35    


 


Cholesterol  144 mg/dL (<200)   19  12:35    


 


LDL Cholesterol Direct  81 mg/dL ()   19  12:35    


 


HDL Cholesterol  50 mg/dL (23-92)   19  12:35    


 


Lithium  1.18 mmol/L (0.5-1.0)  H  19  12:35    














- Physical Exam


Vitals and I&O: 


 Vital Signs











Temp  98.0 F   19 20:45


 


Pulse  74   19 20:45


 


Resp  18   19 20:45


 


BP  107/76   19 20:45


 


Pulse Ox  98   19 20:45








 Intake & Output











 19





 18:59 06:59 18:59


 


Intake Total  120 


 


Balance  120 


 


Weight (lbs)  0 g 


 


Intake:   


 


  Oral  120 


 


Other:   


 


  # Voids  2 


 


  # Bowel Movements  0 


 


  Weight Source  Estimated 











Active Medications: 


Current Medications





Acetaminophen (Tylenol)  650 mg PO Q6H PRN


   PRN Reason: Pain / Temp above 100


   Stop: 10/11/19 14:24


Al Hydrox/Mg Hydrox/Simethicone (Maalox)  30 ml PO Q4H PRN


   PRN Reason: GI DISTRESS


   Stop: 10/11/19 14:24


Ascorbic Acid (Vitamin C)  500 mg PO DAILY GABY


   Stop: 10/12/19 08:59


   Last Admin: 19 08:48 Dose:  500 mg


Clonidine HCl (Catapres-Tts-1)  1 patch TD QTUE GABY


   Stop: 10/12/19 08:59


   Last Admin: 19 10:42 Dose:  Not Given


Docusate Sodium (Colace)  100 mg PO DAILY GABY


   Stop: 10/12/19 08:59


   Last Admin: 19 08:48 Dose:  100 mg


Ferrous Sulfate (Iron)  325 mg PO DAILY GABY


   Stop: 10/12/19 08:59


   Last Admin: 19 08:48 Dose:  325 mg


Haloperidol Lactate (Haldol)  2 mg PO TID GABY


   Stop: 10/11/19 20:59


   Last Admin: 19 13:05 Dose:  2 mg


Lorazepam (Ativan)  0.5 mg PO Q6H PRN; Protocol


   PRN Reason: Agitation


   Stop: 10/11/19 14:24


   Last Admin: 19 20:47 Dose:  0.5 mg


Magnesium Hydroxide (Milk Of Magnesia)  30 ml PO HS PRN


   PRN Reason: Constipation


   Stop: 10/11/19 14:24


Multivitamins/Vitamin C (Theragran)  1 tab PO DAILY GABY


   Stop: 10/12/19 08:59


   Last Admin: 19 08:48 Dose:  1 tab


Trazodone HCl (Desyrel)  50 mg PO HS GABY; Protocol


   Stop: 10/11/19 20:59


   Last Admin: 19 20:47 Dose:  50 mg








General: demented, thin, appears older


HEENT: NC/AT, PERRLA, EOMI


Neck: Supple, No JVD, No thyromegaly, No LAD


Lungs: CTAB


Cardiovascular: RRR, Normal S1, Normal S2


Abdomen: soft, non-tender, thin, non-distended, positive bowel sound


Extremities: excoriation, contracture





Internal Medicine Assmt/Plan





- Assessment


Assessment: 





ASSESSMENT AND PLAN:


1.  Hypertension.


2.  Dehydration.


3.  Renal insufficiency.


4.  Thin built.


5.  History of agitation and psych disorder.


6.  Anemia.


7.  Hyponatremia.


8.  Low albumin.











- Plan


Plan: 








PLAN:  Continue oxygen, bronchodilator treatments.  We will provide the patient


with adequate support with antihypertensive medication.  We will monitor


hemoglobin and hematocrit.  We will review mes  We will continue


monitoring the patient closely.





Nutritional Asmnt/Malnutr-PDOC





- Dietary Evaluation


Malnutrition Findings (Please click <Entered> for more info): 








Nutritional Asmnt/Malnutrition                             Start:  19 11:

29


Text:                                                      Status: Complete    

  


Freq:                                                                          

  


Protocol:                                                                      

  


 Document     19 11:29  JUSTINE  (Rec: 19 11:33  JUSTINE [id number])


 Nutritional Asmnt/Malnutrition


     Patient General Information


      Nutritional Screening                      High Risk


      Diagnosis                                  Psychosis


      Pertinent Medical Hx/Surgical Hx           Psych Disorder, oral ulcer


                                                 disease, COPD


      Subjective Information                     Pt is a 59-year-old male


                                                 admitted on  d/t


                                                 aggressiveness. RNVincenzo,


                                                 stated Pt ate well this


                                                 morning, 70% meal and seemed


                                                 to be in a good mood. As Pt is


                                                 underweight and has refused


                                                 meals d/t psychosis in


                                                 previous hospital visits,


                                                 adding Ensure Enlive TID to


                                                 encourage weight gain trending


                                                 towards IBW.


                                                 HT: 510


                                                 WT: 110 LB (50 kg)


                                                 BMI: 15.79 (Underweight)


                                                 GI: Not noted


                                                 BM: Not Noted


                                                 I/O: Not Noted


                                                 Skin: Not noted


                                                 Clifford: 17


                                                 Diet Order: Cardiac, chopped


                                                 Estimated Energy Needs: (


                                                 Underweight, CBW)


                                                 8145-1681 kcals (30-35 kcals/


                                                 kg)


                                                 50-60g Pro (1.0-1.2 g/kg)


                                                 6136-9906 ml (35-40 ml/kg)


      Current Diet Order/ Nutrition Support      Cardiac, chopped


      Pertinent Medications                      Maalox (PRN), Vitamin C,


                                                 Colace, Ferrous Sulfate, MOM (


                                                 PRN), Theregran


      Pertinent Labs                             : Hgb/Hct 11.6/35.0, Na


                                                 132, BUN/Cr 27/1.1, Alb 3.6


     Nutritional Hx/Data


      Height                                     1.78 m


      Height (Calculated Centimeters)            177.8


      Current Weight (lbs)                       49.895 kg


      Weight (Calculated Kilograms)              49.9


      Weight (Calculated Grams)                  62609.2


      Ideal Body Weight                          166 LB (75.45kg)


      % Ideal Body Weight                        66


      Body Mass Index (BMI)                      15.7


      Weight Status                              Underweight


     GI Symptoms


      Last BM                                    Not noted


      Skin Integrity/Comment:                    Clifford: 17


      Current %PO                                Fair (50-74%)


     Estimated Nutritional Goals


      BEE in Kcals:                              Using Current wt


      Calories/Kcals/Kg                          30-35


      Kcals Calculated                           0821-8766


      Protein:                                   Using Current wt


      Protein g/k.0-1.2


      Protein Calculated                         50-60


      Fluid: ml                                  2368-0365 ml (35-40 ml/kg)


     Nutritional Problem


      1. Problem


       Problem                                   Underweight


       Etiology                                  r/t psychosis


       Signs/Symptoms:                           aeb Pt past behavior of


                                                 refusing meals for consecutive


                                                 days.


     Malnutrition Related to Morbid Obesity


      Malnutrition related to morbid obesity     No


     Intervention/Recommendation


      Comments                                   1. Continue with Cardiac,


                                                 chopped diet as ordered.


                                                 2. Add Ensure Enlive TID (


                                                 completed).


     Expected Outcomes/Goals


      Expected Outcomes/Goals                    1. Continue with Cardiac,


                                                 chopped diet as ordered.


                                                 2. Add Ensure Enlive TID (


                                                 completed).


                                                 3. PO intake to meet 75% of


                                                 nutritional needs.


                                                 4. Monitor PO intake, wt,


                                                 nutrition related labs, and


                                                 skin integrity.


                                                 5. F/U as moderate risk in 3-5


                                                 days, 8/15-

## 2019-08-14 NOTE — PSYCHIATRIC EVALUATION
DATE OF SERVICE:  08/13/2019



HISTORY OF PRESENT ILLNESS:  A 59-year-old male coming in from the Emergency

Room, apparently was unruly and was refusing blood draws, concerns for high

lithium level.  The patient is still aggressive, this afternoon, yelling,

screaming and cursing "get the fuck out," "you don't what you are doing,"

"I know what I am doing with my medications."  I cannot really interview him,

he is simply too agitated, too aggressive, too combative, verbally accosting me

and cursing at me.



Medications were noted including Haldol.



PAST PSYCHIATRIC HISTORY:  Admissions in the past generally.  For the last time,

he was pretty aggressive.



SOCIAL HISTORY:  Family involvement from brother and father, coming from a

skilled nursing.



MENTAL STATUS EXAMINATION:  Stated age.  Fair eye contact, loud, screaming,

yelling, seems tangential.  No overt SI or HI, unclear psychotic symptoms,

although he seems delusional, paranoid, fearful that we are somehow going to

harm him.



PROVISIONAL DIAGNOSES:  Schizophrenia, rule out bipolar.



MEDICAL HISTORY:  Please see full H and P.



ESTIMATED LENGTH OF STAY:  7-10 days.



ASSESSMENT:  The patient is requiring hospitalization, aggressive, agitated,

verbally accosting others, refusing care.



PLAN:  We will continue to monitor.  Continue current dosing of Haldol.



TREATMENT PLAN:  Includes group as well as milieu therapy.



CONDITIONS FOR DISCHARGE:  Improved mood, improved affect, better control of any

hesitation or paranoia.





DD: 08/13/2019 13:12

DT: 08/14/2019 00:40

[facility]# [id number]  [id number]

## 2019-08-15 RX ADMIN — HALOPERIDOL LACTATE SCH MG: 2 SOLUTION, CONCENTRATE ORAL at 20:24

## 2019-08-15 RX ADMIN — HALOPERIDOL LACTATE SCH MG: 2 SOLUTION, CONCENTRATE ORAL at 08:55

## 2019-08-15 RX ADMIN — HALOPERIDOL LACTATE SCH MG: 2 SOLUTION, CONCENTRATE ORAL at 14:56

## 2019-08-15 NOTE — PROGRESS NOTES
DATE:  08/14/2019



SUBJECTIVE:  The patient in the hospital, yelling at me, screaming at me,

threatening to kill me, "I am going to kill you motherfucker," "fuck you." 

The patient was getting angry with roommate, yelling at the roommate, he had to

be moved and per staff, he is generally calmer.  The patient is highly

impulsive, very unpredictable, aggressive.



PLAN:  We will continue to monitor.  I will be increasing his dosing of Haldol.





DD: 08/14/2019 17:04

DT: 08/15/2019 15:31

JOB# [id number]  [id number]

## 2019-08-15 NOTE — INTERNAL MEDICINE PROG NOTE
Internal Medicine Subjective





- Subjective


Patient seen and examined:: with staff, chart reviewed


Patient is:: awake, verbal, interactive, agitated, confused


Per staff patient has:: no adverse event, no episodes of fall, poor appetite, 

confused, tolerating meds





Internal Medicine Objective





- Results


Result Diagrams: 


 19 12:35





 19 12:35


Recent Labs: 


 Laboratory Last Values











WBC  10.3 Th/cmm (4.8-10.8)   19  12:35    


 


RBC  4.00 Mil/cmm (4.30-5.70)  L  19  12:35    


 


Hgb  11.6 gm/dL (12-16)  L  19  12:35    


 


Hct  35.0 % (41.0-60)  L  19  12:35    


 


MCV  87.6 fl (80-99)   19  12:35    


 


MCH  28.9 pg (26.0-30.0)   19  12:35    


 


MCHC Differential  33.0 pg (28.0-36.0)   19  12:35    


 


RDW  15.7 % (11.5-20.0)   19  12:35    


 


Plt Count  335 Th/cmm (150-400)   19  12:35    


 


MPV  8.2 fl  19  12:35    


 


Neutrophils %  63.9 % (40.0-80.0)   19  12:35    


 


Lymphocytes %  21.7 % (20.0-50.0)   19  12:35    


 


Monocytes %  7.4 % (2.0-10.0)   19  12:35    


 


Eosinophils %  5.9 % (0.0-5.0)  H  19  12:35    


 


Basophils %  1.1 % (0.0-2.0)   19  12:35    


 


Sodium  132 mEq/L (136-145)  L  19  12:35    


 


Potassium  4.5 mEq/L (3.5-5.1)   19  12:35    


 


Chloride  102 mEq/L ()   19  12:35    


 


Carbon Dioxide  26.4 mEq/L (21.0-31.0)   19  12:35    


 


Anion Gap  8.1  (7.0-16.0)   19  12:35    


 


BUN  27 mg/dL (7-25)  H  19  12:35    


 


Creatinine  1.1 mg/dL (0.7-1.3)   19  12:35    


 


Est GFR ( Amer)  > 60.0 ml/min (>90)   19  12:35    


 


Est GFR (Non-Af Amer)  > 60.0 ml/min  19  12:35    


 


BUN/Creatinine Ratio  24.5   19  12:35    


 


Glucose  96 mg/dL ()   19  12:35    


 


Calcium  9.7 mg/dL (8.6-10.3)   19  12:35    


 


Total Bilirubin  0.4 mg/dL (0.3-1.0)   19  12:35    


 


AST  16 U/L (13-39)   19  12:35    


 


ALT  13 U/L (7-52)   19  12:35    


 


Alkaline Phosphatase  38 U/L ()   19  12:35    


 


Total Protein  6.4 gm/dL (6.0-8.3)   19  12:35    


 


Albumin  3.6 gm/dL (4.2-5.5)  L  19  12:35    


 


Globulin  2.8 gm/dL  19  12:35    


 


Albumin/Globulin Ratio  1.3  (1.0-1.8)   19  12:35    


 


Triglycerides  86 mg/dL (<150)   19  12:35    


 


Cholesterol  144 mg/dL (<200)   19  12:35    


 


LDL Cholesterol Direct  81 mg/dL ()   19  12:35    


 


HDL Cholesterol  50 mg/dL (23-92)   19  12:35    


 


Lithium  1.18 mmol/L (0.5-1.0)  H  19  12:35    














- Physical Exam


Vitals and I&O: 


 Vital Signs











Temp  98.8 F   19 20:40


 


Pulse  92   19 20:40


 


Resp  18   19 20:40


 


BP  108/65   19 20:40


 


Pulse Ox  99   19 20:40








 Intake & Output











 08/14/19 08/15/19 08/15/19





 18:59 06:59 18:59


 


Intake Total  120 


 


Balance  120 


 


Weight (lbs)  54.431 kg 


 


Intake:   


 


  Oral  120 


 


Other:   


 


  # Voids  2 


 


  # Bowel Movements  0 


 


  Weight Source  Estimated 











Active Medications: 


Current Medications





Acetaminophen (Tylenol)  650 mg PO Q6H PRN


   PRN Reason: Pain / Temp above 100


   Stop: 10/11/19 14:24


Al Hydrox/Mg Hydrox/Simethicone (Maalox)  30 ml PO Q4H PRN


   PRN Reason: GI DISTRESS


   Stop: 10/11/19 14:24


Ascorbic Acid (Vitamin C)  500 mg PO DAILY [facility]


   Stop: 10/12/19 08:59


   Last Admin: 08/15/19 08:55 Dose:  500 mg


Clonidine HCl (Catapres-Tts-1)  1 patch TD QTUE [facility]


   Stop: 10/12/19 08:59


   Last Admin: 19 10:42 Dose:  Not Given


Docusate Sodium (Colace)  100 mg PO DAILY [facility]


   Stop: 10/12/19 08:59


   Last Admin: 08/15/19 08:55 Dose:  100 mg


Ferrous Sulfate (Iron)  325 mg PO DAILY [facility]


   Stop: 10/12/19 08:59


   Last Admin: 08/15/19 08:55 Dose:  325 mg


Haloperidol Lactate (Haldol)  3 mg PO TID [facility]


   Stop: 10/13/19 20:59


   Last Admin: 08/15/19 08:55 Dose:  3 mg


Lorazepam (Ativan)  0.5 mg PO Q6H PRN; Protocol


   PRN Reason: Agitation


   Stop: 10/11/19 14:24


   Last Admin: 19 17:08 Dose:  0.5 mg


Magnesium Hydroxide (Milk Of Magnesia)  30 ml PO HS PRN


   PRN Reason: Constipation


   Stop: 10/11/19 14:24


Multivitamins/Vitamin C (Theragran)  1 tab PO DAILY [facility]


   Stop: 10/12/19 08:59


   Last Admin: 08/15/19 08:55 Dose:  1 tab


Trazodone HCl (Desyrel)  50 mg PO HS [facility]; Protocol


   Stop: 10/11/19 20:59


   Last Admin: 19 20:35 Dose:  50 mg








General: demented, thin, appears older


HEENT: NC/AT, PERRLA, EOMI


Neck: Supple, No JVD, No thyromegaly, No LAD


Lungs: CTAB


Cardiovascular: RRR, Normal S1, Normal S2


Abdomen: soft, non-tender, thin, non-distended, positive bowel sound


Extremities: excoriation, contracture





Internal Medicine Assmt/Plan





- Assessment


Assessment: 





ASSESSMENT AND PLAN:


1.  Hypertension.


2.  Dehydration.


3.  Renal insufficiency.


4.  Thin built.


5.  History of agitation and psych disorder.


6.  Anemia.


7.  Hyponatremia.


8.  Low albumin.











- Plan


Plan: 








PLAN:  Continue oxygen, bronchodilator treatments.  We will provide the patient


with adequate support with antihypertensive medication.  We will monitor


hemoglobin and hematocrit.  We will review mes  We will continue


monitoring the patient closely.





Nutritional Asmnt/Malnutr-PDOC





- Dietary Evaluation


Malnutrition Findings (Please click <Entered> for more info): 








Nutritional Asmnt/Malnutrition                             Start:  19 11:

29


Text:                                                      Status: Complete    

  


Freq:                                                                          

  


Protocol:                                                                      

  


 Document     19 11:29  JUSTINE  (Rec: 19 11:33  JUSTINE [id number])


 Nutritional Asmnt/Malnutrition


     Patient General Information


      Nutritional Screening                      High Risk


      Diagnosis                                  Psychosis


      Pertinent Medical Hx/Surgical Hx           Psych Disorder, oral ulcer


                                                 disease, COPD


      Subjective Information                     Pt is a 59-year-old male


                                                 admitted on  d/t


                                                 aggressiveness. RN, Vincenzo,


                                                 stated Pt ate well this


                                                 morning, 70% meal and seemed


                                                 to be in a good mood. As Pt is


                                                 underweight and has refused


                                                 meals d/t psychosis in


                                                 previous hospital visits,


                                                 adding Ensure Enlive TID to


                                                 encourage weight gain trending


                                                 towards IBW.


                                                 HT: 510


                                                 WT: 110 LB (50 kg)


                                                 BMI: 15.79 (Underweight)


                                                 GI: Not noted


                                                 BM: Not Noted


                                                 I/O: Not Noted


                                                 Skin: Not noted


                                                 Clifford: 17


                                                 Diet Order: Cardiac, chopped


                                                 Estimated Energy Needs: (


                                                 Underweight, CBW)


                                                 7077-7486 kcals (30-35 kcals/


                                                 kg)


                                                 50-60g Pro (1.0-1.2 g/kg)


                                                 2150-4442 ml (35-40 ml/kg)


      Current Diet Order/ Nutrition Support      Cardiac, chopped


      Pertinent Medications                      Maalox (PRN), Vitamin C,


                                                 Colace, Ferrous Sulfate, MOM (


                                                 PRN), Theregran


      Pertinent Labs                             : Hgb/Hct 11.6/35.0, Na


                                                 132, BUN/Cr 27/1.1, Alb 3.6


     Nutritional Hx/Data


      Height                                     1.78 m


      Height (Calculated Centimeters)            177.8


      Current Weight (lbs)                       49.895 kg


      Weight (Calculated Kilograms)              49.9


      Weight (Calculated Grams)                  92795.2


      Ideal Body Weight                          166 LB (75.45kg)


      % Ideal Body Weight                        66


      Body Mass Index (BMI)                      15.7


      Weight Status                              Underweight


     GI Symptoms


      Last BM                                    Not noted


      Skin Integrity/Comment:                    Clifford: 17


      Current %PO                                Fair (50-74%)


     Estimated Nutritional Goals


      BEE in Kcals:                              Using Current wt


      Calories/Kcals/Kg                          30-35


      Kcals Calculated                           6139-1746


      Protein:                                   Using Current wt


      Protein g/k.0-1.2


      Protein Calculated                         50-60


      Fluid: ml                                  6374-6195 ml (35-40 ml/kg)


     Nutritional Problem


      1. Problem


       Problem                                   Underweight


       Etiology                                  r/t psychosis


       Signs/Symptoms:                           aeb Pt past behavior of


                                                 refusing meals for consecutive


                                                 days.


     Malnutrition Related to Morbid Obesity


      Malnutrition related to morbid obesity     No


     Intervention/Recommendation


      Comments                                   1. Continue with Cardiac,


                                                 chopped diet as ordered.


                                                 2. Add Ensure Enlive TID (


                                                 completed).


     Expected Outcomes/Goals


      Expected Outcomes/Goals                    1. Continue with Cardiac,


                                                 chopped diet as ordered.


                                                 2. Add Ensure Enlive TID (


                                                 completed).


                                                 3. PO intake to meet 75% of


                                                 nutritional needs.


                                                 4. Monitor PO intake, wt,


                                                 nutrition related labs, and


                                                 skin integrity.


                                                 5. F/U as moderate risk in 3-5


                                                 days, 8/15-

## 2019-08-15 NOTE — PROGRESS NOTES
DATE:  08/15/2019



Case was discussed with staff of the patient, reviewed records.



Covering for Dr. Che.



Case was discussed with staff of the patient, reviewed records ____ many times

so covering for Dr. Che.  The patient was readmitted on 08/12/2019 because

of aggressive behavior, yelling and screaming, cursing, using foul language. 

When I tried to talk to him, he was very irritable, aggressive, asking not to

come into the room and keep saying "no, no, no."  He is unpredictable,

impulsive, needing redirection, has been compliant with the medication with no

side effects from the Haldol 3 mg 3 times a day with no side effects, no

sedation, no nausea.  We will continue outpatient group therapy, milieu therapy,

and adjust medication as needed.





DD: 08/15/2019 11:04

DT: 08/15/2019 23:32

JOB# [id number]  [id number]

## 2019-08-16 RX ADMIN — HALOPERIDOL LACTATE SCH MG: 2 SOLUTION, CONCENTRATE ORAL at 08:30

## 2019-08-16 RX ADMIN — HALOPERIDOL LACTATE SCH MG: 2 SOLUTION, CONCENTRATE ORAL at 14:06

## 2019-08-16 RX ADMIN — HALOPERIDOL LACTATE SCH MG: 2 SOLUTION, CONCENTRATE ORAL at 20:35

## 2019-08-16 NOTE — INTERNAL MEDICINE PROG NOTE
Internal Medicine Subjective





- Subjective


Patient seen and examined:: with staff, chart reviewed


Patient is:: awake, verbal, interactive, agitated, confused


Per staff patient has:: no adverse event, no episodes of fall, poor appetite, 

confused, tolerating meds





Internal Medicine Objective





- Results


Result Diagrams: 


 19 12:35





 19 12:35


Recent Labs: 


 Laboratory Last Values











WBC  10.3 Th/cmm (4.8-10.8)   19  12:35    


 


RBC  4.00 Mil/cmm (4.30-5.70)  L  19  12:35    


 


Hgb  11.6 gm/dL (12-16)  L  19  12:35    


 


Hct  35.0 % (41.0-60)  L  19  12:35    


 


MCV  87.6 fl (80-99)   19  12:35    


 


MCH  28.9 pg (26.0-30.0)   19  12:35    


 


MCHC Differential  33.0 pg (28.0-36.0)   19  12:35    


 


RDW  15.7 % (11.5-20.0)   19  12:35    


 


Plt Count  335 Th/cmm (150-400)   19  12:35    


 


MPV  8.2 fl  19  12:35    


 


Neutrophils %  63.9 % (40.0-80.0)   19  12:35    


 


Lymphocytes %  21.7 % (20.0-50.0)   19  12:35    


 


Monocytes %  7.4 % (2.0-10.0)   19  12:35    


 


Eosinophils %  5.9 % (0.0-5.0)  H  19  12:35    


 


Basophils %  1.1 % (0.0-2.0)   19  12:35    


 


Sodium  132 mEq/L (136-145)  L  19  12:35    


 


Potassium  4.5 mEq/L (3.5-5.1)   19  12:35    


 


Chloride  102 mEq/L ()   19  12:35    


 


Carbon Dioxide  26.4 mEq/L (21.0-31.0)   19  12:35    


 


Anion Gap  8.1  (7.0-16.0)   19  12:35    


 


BUN  27 mg/dL (7-25)  H  19  12:35    


 


Creatinine  1.1 mg/dL (0.7-1.3)   19  12:35    


 


Est GFR ( Amer)  > 60.0 ml/min (>90)   19  12:35    


 


Est GFR (Non-Af Amer)  > 60.0 ml/min  19  12:35    


 


BUN/Creatinine Ratio  24.5   19  12:35    


 


Glucose  96 mg/dL ()   19  12:35    


 


Calcium  9.7 mg/dL (8.6-10.3)   19  12:35    


 


Total Bilirubin  0.4 mg/dL (0.3-1.0)   19  12:35    


 


AST  16 U/L (13-39)   19  12:35    


 


ALT  13 U/L (7-52)   19  12:35    


 


Alkaline Phosphatase  38 U/L ()   19  12:35    


 


Total Protein  6.4 gm/dL (6.0-8.3)   19  12:35    


 


Albumin  3.6 gm/dL (4.2-5.5)  L  19  12:35    


 


Globulin  2.8 gm/dL  19  12:35    


 


Albumin/Globulin Ratio  1.3  (1.0-1.8)   19  12:35    


 


Triglycerides  86 mg/dL (<150)   19  12:35    


 


Cholesterol  144 mg/dL (<200)   19  12:35    


 


LDL Cholesterol Direct  81 mg/dL ()   19  12:35    


 


HDL Cholesterol  50 mg/dL (23-92)   19  12:35    


 


Lithium  1.18 mmol/L (0.5-1.0)  H  19  12:35    














- Physical Exam


Vitals and I&O: 


 Vital Signs











Temp  97.4 F   19 06:46


 


Pulse  62   19 06:46


 


Resp  18   19 06:46


 


BP  120/78   19 06:46


 


Pulse Ox  97   19 06:46








 Intake & Output











 08/15/19 08/16/19 08/16/19





 18:59 06:59 18:59


 


Intake Total 1000 120 


 


Balance 1000 120 


 


Weight (lbs) 53.524 kg 53.524 kg 


 


Intake:   


 


  Oral 1000 120 


 


Other:   


 


  # Voids 2 3 


 


  Weight Source Bedscale Bedscale 











Active Medications: 


Current Medications





Acetaminophen (Tylenol)  650 mg PO Q6H PRN


   PRN Reason: Pain / Temp above 100


   Stop: 10/11/19 14:24


Al Hydrox/Mg Hydrox/Simethicone (Maalox)  30 ml PO Q4H PRN


   PRN Reason: GI DISTRESS


   Stop: 10/11/19 14:24


Ascorbic Acid (Vitamin C)  500 mg PO DAILY [facility]


   Stop: 10/12/19 08:59


   Last Admin: 19 08:30 Dose:  500 mg


Clonidine HCl (Catapres-Tts-1)  1 patch TD QTUE [facility]


   Stop: 10/12/19 08:59


   Last Admin: 19 10:42 Dose:  Not Given


Docusate Sodium (Colace)  100 mg PO DAILY [facility]


   Stop: 10/12/19 08:59


   Last Admin: 19 08:30 Dose:  100 mg


Ferrous Sulfate (Iron)  325 mg PO DAILY [facility]


   Stop: 10/12/19 08:59


   Last Admin: 19 08:30 Dose:  325 mg


Haloperidol Lactate (Haldol)  3 mg PO TID [facility]


   Stop: 10/13/19 20:59


   Last Admin: 19 08:30 Dose:  3 mg


Lorazepam (Ativan)  0.5 mg PO Q6H PRN; Protocol


   PRN Reason: Agitation


   Stop: 10/11/19 14:24


   Last Admin: 19 17:08 Dose:  0.5 mg


Magnesium Hydroxide (Milk Of Magnesia)  30 ml PO HS PRN


   PRN Reason: Constipation


   Stop: 10/11/19 14:24


Multivitamins/Vitamin C (Theragran)  1 tab PO DAILY [facility]


   Stop: 10/12/19 08:59


   Last Admin: 19 08:30 Dose:  1 tab


Trazodone HCl (Desyrel)  50 mg PO HS GABY; Protocol


   Stop: 10/11/19 20:59


   Last Admin: 08/15/19 20:24 Dose:  50 mg








General: demented, thin, appears older


HEENT: NC/AT, PERRLA, EOMI


Neck: Supple, No JVD, No thyromegaly, No LAD


Lungs: CTAB


Cardiovascular: RRR, Normal S1, Normal S2


Abdomen: soft, non-tender, thin, non-distended, positive bowel sound


Extremities: excoriation, contracture





Internal Medicine Assmt/Plan





- Assessment


Assessment: 





ASSESSMENT AND PLAN:


1.  Hypertension.


2.  Dehydration.


3.  Renal insufficiency.


4.  Thin built.


5.  History of agitation and psych disorder.


6.  Anemia.


7.  Hyponatremia.


8.  Low albumin.











- Plan


Plan: 








PLAN:  Continue oxygen, bronchodilator treatments.  We will provide the patient


with adequate support with antihypertensive medication.  We will monitor


hemoglobin and hematocrit.  We will review mes  We will continue


monitoring the patient closely.





Nutritional Asmnt/Malnutr-PDOC





- Dietary Evaluation


Malnutrition Findings (Please click <Entered> for more info): 








Nutritional Asmnt/Malnutrition                             Start:  19 11:

29


Text:                                                      Status: Complete    

  


Freq:                                                                          

  


Protocol:                                                                      

  


 Document     19 11:29  JUSTINE  (Rec: 19 11:33  JUSTINE [id number])


 Nutritional Asmnt/Malnutrition


     Patient General Information


      Nutritional Screening                      High Risk


      Diagnosis                                  Psychosis


      Pertinent Medical Hx/Surgical Hx           Psych Disorder, oral ulcer


                                                 disease, COPD


      Subjective Information                     Pt is a 59-year-old male


                                                 admitted on  d/t


                                                 aggressiveness. RNVincenzo,


                                                 stated Pt ate well this


                                                 morning, 70% meal and seemed


                                                 to be in a good mood. As Pt is


                                                 underweight and has refused


                                                 meals d/t psychosis in


                                                 previous hospital visits,


                                                 adding Ensure Enlive TID to


                                                 encourage weight gain trending


                                                 towards IBW.


                                                 HT: 510


                                                 WT: 110 LB (50 kg)


                                                 BMI: 15.79 (Underweight)


                                                 GI: Not noted


                                                 BM: Not Noted


                                                 I/O: Not Noted


                                                 Skin: Not noted


                                                 Clifford: 17


                                                 Diet Order: Cardiac, chopped


                                                 Estimated Energy Needs: (


                                                 Underweight, CBW)


                                                 0337-7620 kcals (30-35 kcals/


                                                 kg)


                                                 50-60g Pro (1.0-1.2 g/kg)


                                                 8687-7347 ml (35-40 ml/kg)


      Current Diet Order/ Nutrition Support      Cardiac, chopped


      Pertinent Medications                      Maalox (PRN), Vitamin C,


                                                 Colace, Ferrous Sulfate, MOM (


                                                 PRN), Theregran


      Pertinent Labs                             : Hgb/Hct 11.6/35.0, Na


                                                 132, BUN/Cr 27/1.1, Alb 3.6


     Nutritional Hx/Data


      Height                                     1.78 m


      Height (Calculated Centimeters)            177.8


      Current Weight (lbs)                       49.895 kg


      Weight (Calculated Kilograms)              49.9


      Weight (Calculated Grams)                  12613.2


      Ideal Body Weight                          166 LB (75.45kg)


      % Ideal Body Weight                        66


      Body Mass Index (BMI)                      15.7


      Weight Status                              Underweight


     GI Symptoms


      Last BM                                    Not noted


      Skin Integrity/Comment:                    Clifford: 17


      Current %PO                                Fair (50-74%)


     Estimated Nutritional Goals


      BEE in Kcals:                              Using Current wt


      Calories/Kcals/Kg                          30-35


      Kcals Calculated                           4615-4116


      Protein:                                   Using Current wt


      Protein g/k.0-1.2


      Protein Calculated                         50-60


      Fluid: ml                                  9049-9305 ml (35-40 ml/kg)


     Nutritional Problem


      1. Problem


       Problem                                   Underweight


       Etiology                                  r/t psychosis


       Signs/Symptoms:                           aeb Pt past behavior of


                                                 refusing meals for consecutive


                                                 days.


     Malnutrition Related to Morbid Obesity


      Malnutrition related to morbid obesity     No


     Intervention/Recommendation


      Comments                                   1. Continue with Cardiac,


                                                 chopped diet as ordered.


                                                 2. Add Ensure Enlive TID (


                                                 completed).


     Expected Outcomes/Goals


      Expected Outcomes/Goals                    1. Continue with Cardiac,


                                                 chopped diet as ordered.


                                                 2. Add Ensure Enlive TID (


                                                 completed).


                                                 3. PO intake to meet 75% of


                                                 nutritional needs.


                                                 4. Monitor PO intake, wt,


                                                 nutrition related labs, and


                                                 skin integrity.


                                                 5. F/U as moderate risk in 3-5


                                                 days, 8/15-

## 2019-08-17 RX ADMIN — HALOPERIDOL LACTATE SCH MG: 2 SOLUTION, CONCENTRATE ORAL at 14:32

## 2019-08-17 RX ADMIN — HALOPERIDOL LACTATE SCH MG: 2 SOLUTION, CONCENTRATE ORAL at 08:33

## 2019-08-17 RX ADMIN — HALOPERIDOL LACTATE SCH MG: 2 SOLUTION, CONCENTRATE ORAL at 20:31

## 2019-08-17 NOTE — INTERNAL MEDICINE PROG NOTE
Internal Medicine Subjective





- Subjective


Patient seen and examined:: with staff, chart reviewed


Patient is:: awake, verbal, interactive, agitated, confused


Per staff patient has:: no adverse event, no episodes of fall, poor appetite, 

confused, tolerating meds





Internal Medicine Objective





- Results


Result Diagrams: 


 19 12:35





 19 12:35


Recent Labs: 


 Laboratory Last Values











WBC  10.3 Th/cmm (4.8-10.8)   19  12:35    


 


RBC  4.00 Mil/cmm (4.30-5.70)  L  19  12:35    


 


Hgb  11.6 gm/dL (12-16)  L  19  12:35    


 


Hct  35.0 % (41.0-60)  L  19  12:35    


 


MCV  87.6 fl (80-99)   19  12:35    


 


MCH  28.9 pg (26.0-30.0)   19  12:35    


 


MCHC Differential  33.0 pg (28.0-36.0)   19  12:35    


 


RDW  15.7 % (11.5-20.0)   19  12:35    


 


Plt Count  335 Th/cmm (150-400)   19  12:35    


 


MPV  8.2 fl  19  12:35    


 


Neutrophils %  63.9 % (40.0-80.0)   19  12:35    


 


Lymphocytes %  21.7 % (20.0-50.0)   19  12:35    


 


Monocytes %  7.4 % (2.0-10.0)   19  12:35    


 


Eosinophils %  5.9 % (0.0-5.0)  H  19  12:35    


 


Basophils %  1.1 % (0.0-2.0)   19  12:35    


 


Sodium  132 mEq/L (136-145)  L  19  12:35    


 


Potassium  4.5 mEq/L (3.5-5.1)   19  12:35    


 


Chloride  102 mEq/L ()   19  12:35    


 


Carbon Dioxide  26.4 mEq/L (21.0-31.0)   19  12:35    


 


Anion Gap  8.1  (7.0-16.0)   19  12:35    


 


BUN  27 mg/dL (7-25)  H  19  12:35    


 


Creatinine  1.1 mg/dL (0.7-1.3)   19  12:35    


 


Est GFR ( Amer)  > 60.0 ml/min (>90)   19  12:35    


 


Est GFR (Non-Af Amer)  > 60.0 ml/min  19  12:35    


 


BUN/Creatinine Ratio  24.5   19  12:35    


 


Glucose  96 mg/dL ()   19  12:35    


 


Calcium  9.7 mg/dL (8.6-10.3)   19  12:35    


 


Total Bilirubin  0.4 mg/dL (0.3-1.0)   19  12:35    


 


AST  16 U/L (13-39)   19  12:35    


 


ALT  13 U/L (7-52)   19  12:35    


 


Alkaline Phosphatase  38 U/L ()   19  12:35    


 


Total Protein  6.4 gm/dL (6.0-8.3)   19  12:35    


 


Albumin  3.6 gm/dL (4.2-5.5)  L  19  12:35    


 


Globulin  2.8 gm/dL  19  12:35    


 


Albumin/Globulin Ratio  1.3  (1.0-1.8)   19  12:35    


 


Triglycerides  86 mg/dL (<150)   19  12:35    


 


Cholesterol  144 mg/dL (<200)   19  12:35    


 


LDL Cholesterol Direct  81 mg/dL ()   19  12:35    


 


HDL Cholesterol  50 mg/dL (23-92)   19  12:35    


 


Lithium  1.18 mmol/L (0.5-1.0)  H  19  12:35    














- Physical Exam


Vitals and I&O: 


 Vital Signs











Temp  98.5 F   19 06:05


 


Pulse  92   19 20:00


 


Resp  19   19 20:00


 


BP  102/69   19 20:00


 


Pulse Ox  94   19 20:00








 Intake & Output











 19





 18:59 06:59 18:59


 


Intake Total 800 720 


 


Balance 800 720 


 


Weight (lbs) 53.524 kg 36.287 kg 


 


Intake:   


 


  Oral 800 720 


 


Other:   


 


  # Voids 3  


 


  # Bowel Movements 1  


 


  Weight Source Estimated Bedscale 











Active Medications: 


Current Medications





Acetaminophen (Tylenol)  650 mg PO Q6H PRN


   PRN Reason: Pain / Temp above 100


   Stop: 10/11/19 14:24


Al Hydrox/Mg Hydrox/Simethicone (Maalox)  30 ml PO Q4H PRN


   PRN Reason: GI DISTRESS


   Stop: 10/11/19 14:24


Ascorbic Acid (Vitamin C)  500 mg PO DAILY [facility]


   Stop: 10/12/19 08:59


   Last Admin: 19 08:32 Dose:  500 mg


Clonidine HCl (Catapres-Tts-1)  1 patch TD QTUE [facility]


   Stop: 10/12/19 08:59


   Last Admin: 19 10:42 Dose:  Not Given


Docusate Sodium (Colace)  100 mg PO DAILY [facility]


   Stop: 10/12/19 08:59


   Last Admin: 19 08:33 Dose:  100 mg


Ferrous Sulfate (Iron)  325 mg PO DAILY [facility]


   Stop: 10/12/19 08:59


   Last Admin: 19 08:33 Dose:  325 mg


Haloperidol Lactate (Haldol)  3 mg PO TID [facility]


   Stop: 10/13/19 20:59


   Last Admin: 19 08:33 Dose:  3 mg


Lorazepam (Ativan)  0.5 mg PO Q6H PRN; Protocol


   PRN Reason: Agitation


   Stop: 10/11/19 14:24


   Last Admin: 19 17:08 Dose:  0.5 mg


Magnesium Hydroxide (Milk Of Magnesia)  30 ml PO HS PRN


   PRN Reason: Constipation


   Stop: 10/11/19 14:24


Multivitamins/Vitamin C (Theragran)  1 tab PO DAILY [facility]


   Stop: 10/12/19 08:59


   Last Admin: 19 08:33 Dose:  1 tab


Trazodone HCl (Desyrel)  50 mg PO HS [facility]ABY; Protocol


   Stop: 10/11/19 20:59


   Last Admin: 19 20:36 Dose:  50 mg








General: demented, thin, appears older


HEENT: NC/AT, PERRLA, EOMI


Neck: Supple, No JVD, No thyromegaly, No LAD


Lungs: CTAB


Cardiovascular: RRR, Normal S1, Normal S2


Abdomen: soft, non-tender, thin, non-distended, positive bowel sound


Extremities: excoriation, contracture





Internal Medicine Assmt/Plan





- Assessment


Assessment: 





ASSESSMENT AND PLAN:


1.  Hypertension.


2.  Dehydration.


3.  Renal insufficiency.


4.  Thin built.


5.  History of agitation and psych disorder.


6.  Anemia.


7.  Hyponatremia.


8.  Low albumin.











- Plan


Plan: 








PLAN:  Continue oxygen, bronchodilator treatments.  We will provide the patient


with adequate support with antihypertensive medication.  We will monitor


hemoglobin and hematocrit.  We will review mes  We will continue


monitoring the patient closely.





Nutritional Asmnt/Malnutr-PDOC





- Dietary Evaluation


Malnutrition Findings (Please click <Entered> for more info): 








Nutritional Asmnt/Malnutrition                             Start:  19 11:

29


Text:                                                      Status: Complete    

  


Freq:                                                                          

  


Protocol:                                                                      

  


 Document     19 11:29  JUSTINE  (Rec: 19 11:33  JUSTINE  [id number])


 Nutritional Asmnt/Malnutrition


     Patient General Information


      Nutritional Screening                      High Risk


      Diagnosis                                  Psychosis


      Pertinent Medical Hx/Surgical Hx           Psych Disorder, oral ulcer


                                                 disease, COPD


      Subjective Information                     Pt is a 59-year-old male


                                                 admitted on  d/t


                                                 aggressiveness. RN, Vincenzo,


                                                 stated Pt ate well this


                                                 morning, 70% meal and seemed


                                                 to be in a good mood. As Pt is


                                                 underweight and has refused


                                                 meals d/t psychosis in


                                                 previous hospital visits,


                                                 adding Ensure Enlive TID to


                                                 encourage weight gain trending


                                                 towards IBW.


                                                 HT: 510


                                                 WT: 110 LB (50 kg)


                                                 BMI: 15.79 (Underweight)


                                                 GI: Not noted


                                                 BM: Not Noted


                                                 I/O: Not Noted


                                                 Skin: Not noted


                                                 Clifford: 17


                                                 Diet Order: Cardiac, chopped


                                                 Estimated Energy Needs: (


                                                 Underweight, CBW)


                                                 7775-9230 kcals (30-35 kcals/


                                                 kg)


                                                 50-60g Pro (1.0-1.2 g/kg)


                                                 3868-7797 ml (35-40 ml/kg)


      Current Diet Order/ Nutrition Support      Cardiac, chopped


      Pertinent Medications                      Maalox (PRN), Vitamin C,


                                                 Colace, Ferrous Sulfate, MOM (


                                                 PRN), Theregran


      Pertinent Labs                             : Hgb/Hct 11.6/35.0, Na


                                                 132, BUN/Cr 27/1.1, Alb 3.6


     Nutritional Hx/Data


      Height                                     1.78 m


      Height (Calculated Centimeters)            177.8


      Current Weight (lbs)                       49.895 kg


      Weight (Calculated Kilograms)              49.9


      Weight (Calculated Grams)                  92400.2


      Ideal Body Weight                          166 LB (75.45kg)


      % Ideal Body Weight                        66


      Body Mass Index (BMI)                      15.7


      Weight Status                              Underweight


     GI Symptoms


      Last BM                                    Not noted


      Skin Integrity/Comment:                    Clifford: 17


      Current %PO                                Fair (50-74%)


     Estimated Nutritional Goals


      BEE in Kcals:                              Using Current wt


      Calories/Kcals/Kg                          30-35


      Kcals Calculated                           4551-0778


      Protein:                                   Using Current wt


      Protein g/k.0-1.2


      Protein Calculated                         50-60


      Fluid: ml                                  8766-7931 ml (35-40 ml/kg)


     Nutritional Problem


      1. Problem


       Problem                                   Underweight


       Etiology                                  r/t psychosis


       Signs/Symptoms:                           aeb Pt past behavior of


                                                 refusing meals for consecutive


                                                 days.


     Malnutrition Related to Morbid Obesity


      Malnutrition related to morbid obesity     No


     Intervention/Recommendation


      Comments                                   1. Continue with Cardiac,


                                                 chopped diet as ordered.


                                                 2. Add Ensure Enlive TID (


                                                 completed).


     Expected Outcomes/Goals


      Expected Outcomes/Goals                    1. Continue with Cardiac,


                                                 chopped diet as ordered.


                                                 2. Add Ensure Enlive TID (


                                                 completed).


                                                 3. PO intake to meet 75% of


                                                 nutritional needs.


                                                 4. Monitor PO intake, wt,


                                                 nutrition related labs, and


                                                 skin integrity.


                                                 5. F/U as moderate risk in 3-5


                                                 days, 8/15-

## 2019-08-17 NOTE — PROGRESS NOTES
DATE:  08/17/2019



Case was discussed with staff of the patient, reviewed records.  The patient

continues to be confused, easily agitated, irritable, continues to need

redirection, labile and easily angered, but in general, the staff reports a bit

more calm.  He is sleeping better, eating better, has been compliant with the

medication with no side effects, no sedation, no nausea, no extrapyramidal

symptoms.  We will continue outpatient group therapy, milieu therapy, adjust

medication as needed.





DD: 08/17/2019 13:01

DT: 08/17/2019 15:51

[facility]# [id number]  [id number]

## 2019-08-17 NOTE — PROGRESS NOTES
DATE:     08/16/2019



Covering for Dr. Che.



Case was discussed with staff of the patient, reviewed records.  The patient

continues to be unpredictable, impulsive, labile,  and threatening.  He

continues to have poor insight, getting angered very easily.  His Haldol dose

was increased by Dr. Che 2 days ago with no side effects, no sedation, no

nausea, no extrapyramidal symptoms.  We will continue outpatient group therapy,

milieu therapy, and adjust medication as needed.





DD: 08/16/2019 12:52

DT: 08/17/2019 01:00

[facility]# [id number]  [id number]

[facility]

## 2019-08-18 RX ADMIN — HALOPERIDOL LACTATE SCH MG: 2 SOLUTION, CONCENTRATE ORAL at 21:24

## 2019-08-18 RX ADMIN — HALOPERIDOL LACTATE SCH: 2 SOLUTION, CONCENTRATE ORAL at 14:25

## 2019-08-18 RX ADMIN — HALOPERIDOL LACTATE SCH MG: 2 SOLUTION, CONCENTRATE ORAL at 08:50

## 2019-08-18 NOTE — PROGRESS NOTES
DATE:     08/18/2019



Case was discussed with staff of the patient, reviewed records.  The patient

gets, easily agitated, irritable.  Continues to have poor insight, unable to

make safe plan for self-care, unpredictable, impulsive, needing redirection.  He

used to get upset.  No side effects of the medication, no sedation, no nausea. 

We will continue outpatient group therapy, milieu therapy, and adjust medication

as needed.





DD: 08/18/2019 11:55

DT: 08/18/2019 14:52

[facility]# [id number]  [id number]

[facility]

## 2019-08-18 NOTE — INTERNAL MEDICINE PROG NOTE
Internal Medicine Subjective





- Subjective


Patient seen and examined:: with staff, chart reviewed


Patient is:: awake, verbal, interactive, agitated, confused


Per staff patient has:: no adverse event, no episodes of fall, poor appetite, 

confused, tolerating meds





Internal Medicine Objective





- Results


Result Diagrams: 


 19 12:35





 19 12:35


Recent Labs: 


 Laboratory Last Values











WBC  10.3 Th/cmm (4.8-10.8)   19  12:35    


 


RBC  4.00 Mil/cmm (4.30-5.70)  L  19  12:35    


 


Hgb  11.6 gm/dL (12-16)  L  19  12:35    


 


Hct  35.0 % (41.0-60)  L  19  12:35    


 


MCV  87.6 fl (80-99)   19  12:35    


 


MCH  28.9 pg (26.0-30.0)   19  12:35    


 


MCHC Differential  33.0 pg (28.0-36.0)   19  12:35    


 


RDW  15.7 % (11.5-20.0)   19  12:35    


 


Plt Count  335 Th/cmm (150-400)   19  12:35    


 


MPV  8.2 fl  19  12:35    


 


Neutrophils %  63.9 % (40.0-80.0)   19  12:35    


 


Lymphocytes %  21.7 % (20.0-50.0)   19  12:35    


 


Monocytes %  7.4 % (2.0-10.0)   19  12:35    


 


Eosinophils %  5.9 % (0.0-5.0)  H  19  12:35    


 


Basophils %  1.1 % (0.0-2.0)   19  12:35    


 


Sodium  132 mEq/L (136-145)  L  19  12:35    


 


Potassium  4.5 mEq/L (3.5-5.1)   19  12:35    


 


Chloride  102 mEq/L ()   19  12:35    


 


Carbon Dioxide  26.4 mEq/L (21.0-31.0)   19  12:35    


 


Anion Gap  8.1  (7.0-16.0)   19  12:35    


 


BUN  27 mg/dL (7-25)  H  19  12:35    


 


Creatinine  1.1 mg/dL (0.7-1.3)   19  12:35    


 


Est GFR ( Amer)  > 60.0 ml/min (>90)   19  12:35    


 


Est GFR (Non-Af Amer)  > 60.0 ml/min  19  12:35    


 


BUN/Creatinine Ratio  24.5   19  12:35    


 


Glucose  96 mg/dL ()   19  12:35    


 


Calcium  9.7 mg/dL (8.6-10.3)   19  12:35    


 


Total Bilirubin  0.4 mg/dL (0.3-1.0)   19  12:35    


 


AST  16 U/L (13-39)   19  12:35    


 


ALT  13 U/L (7-52)   19  12:35    


 


Alkaline Phosphatase  38 U/L ()   19  12:35    


 


Total Protein  6.4 gm/dL (6.0-8.3)   19  12:35    


 


Albumin  3.6 gm/dL (4.2-5.5)  L  19  12:35    


 


Globulin  2.8 gm/dL  19  12:35    


 


Albumin/Globulin Ratio  1.3  (1.0-1.8)   19  12:35    


 


Triglycerides  86 mg/dL (<150)   19  12:35    


 


Cholesterol  144 mg/dL (<200)   19  12:35    


 


LDL Cholesterol Direct  81 mg/dL ()   19  12:35    


 


HDL Cholesterol  50 mg/dL (23-92)   19  12:35    


 


Lithium  1.18 mmol/L (0.5-1.0)  H  19  12:35    














- Physical Exam


Vitals and I&O: 


 Vital Signs











Temp  97.7 F   19 06:14


 


Pulse  69   19 06:14


 


Resp  18   19 06:14


 


BP  97/64   19 06:14


 


Pulse Ox  98   19 06:14








 Intake & Output











 19





 18:59 06:59 18:59


 


Intake Total 1200  


 


Balance 1200  


 


Weight (lbs) 50.802 kg  


 


Intake:   


 


  Oral 1200  


 


Other:   


 


  # Bowel Movements 2  


 


  Weight Source Estimated  











Active Medications: 


Current Medications





Acetaminophen (Tylenol)  650 mg PO Q6H PRN


   PRN Reason: Pain / Temp above 100


   Stop: 10/11/19 14:24


Al Hydrox/Mg Hydrox/Simethicone (Maalox)  30 ml PO Q4H PRN


   PRN Reason: GI DISTRESS


   Stop: 10/11/19 14:24


Ascorbic Acid (Vitamin C)  500 mg PO DAILY [facility]


   Stop: 10/12/19 08:59


   Last Admin: 19 08:50 Dose:  500 mg


Clonidine HCl (Catapres-Tts-1)  1 patch TD QTUE [facility]


   Stop: 10/12/19 08:59


   Last Admin: 19 10:42 Dose:  Not Given


Docusate Sodium (Colace)  100 mg PO DAILY [facility]


   Stop: 10/12/19 08:59


   Last Admin: 19 08:50 Dose:  100 mg


Ferrous Sulfate (Iron)  325 mg PO DAILY [facility]


   Stop: 10/12/19 08:59


   Last Admin: 19 08:50 Dose:  325 mg


Haloperidol Lactate (Haldol)  3 mg PO TID [facility]


   Stop: 10/13/19 20:59


   Last Admin: 19 08:50 Dose:  3 mg


Lorazepam (Ativan)  0.5 mg PO Q6H PRN; Protocol


   PRN Reason: Agitation


   Stop: 10/11/19 14:24


   Last Admin: 19 22:57 Dose:  0.5 mg


Magnesium Hydroxide (Milk Of Magnesia)  30 ml PO HS PRN


   PRN Reason: Constipation


   Stop: 10/11/19 14:24


Multivitamins/Vitamin C (Theragran)  1 tab PO DAILY [facility]


   Stop: 10/12/19 08:59


   Last Admin: 19 08:50 Dose:  1 tab


Trazodone HCl (Desyrel)  50 mg PO HS [facility]; Protocol


   Stop: 10/11/19 20:59


   Last Admin: 19 20:31 Dose:  50 mg








General: demented, thin, appears older


HEENT: NC/AT, PERRLA, EOMI


Neck: Supple, No JVD, No thyromegaly, No LAD


Lungs: CTAB


Cardiovascular: RRR, Normal S1, Normal S2


Abdomen: soft, non-tender, thin, non-distended, positive bowel sound


Extremities: excoriation, contracture





Internal Medicine Assmt/Plan





- Assessment


Assessment: 





ASSESSMENT AND PLAN:


1.  Hypertension.


2.  Dehydration.


3.  Renal insufficiency.


4.  Thin built.


5.  History of agitation and psych disorder.


6.  Anemia.


7.  Hyponatremia.


8.  Low albumin.











- Plan


Plan: 








PLAN:  Continue oxygen, bronchodilator treatments.  We will provide the patient


with adequate support with antihypertensive medication.  We will monitor


hemoglobin and hematocrit.  We will review mes  We will continue


monitoring the patient closely.





Nutritional Asmnt/Malnutr-PDOC





- Dietary Evaluation


Malnutrition Findings (Please click <Entered> for more info): 








Nutritional Asmnt/Malnutrition                             Start:  19 11:

29


Text:                                                      Status: Complete    

  


Freq:                                                                          

  


Protocol:                                                                      

  


 Document     19 11:29  JUSTINE  (Rec: 19 11:33  JUSTINE [id number])


 Nutritional Asmnt/Malnutrition


     Patient General Information


      Nutritional Screening                      High Risk


      Diagnosis                                  Psychosis


      Pertinent Medical Hx/Surgical Hx           Psych Disorder, oral ulcer


                                                 disease, COPD


      Subjective Information                     Pt is a 59-year-old male


                                                 admitted on  d/t


                                                 aggressiveness. RNVincenzo,


                                                 stated Pt ate well this


                                                 morning, 70% meal and seemed


                                                 to be in a good mood. As Pt is


                                                 underweight and has refused


                                                 meals d/t psychosis in


                                                 previous hospital visits,


                                                 adding Ensure Enlive TID to


                                                 encourage weight gain trending


                                                 towards IBW.


                                                 HT: 510


                                                 WT: 110 LB (50 kg)


                                                 BMI: 15.79 (Underweight)


                                                 GI: Not noted


                                                 BM: Not Noted


                                                 I/O: Not Noted


                                                 Skin: Not noted


                                                 Clifford: 17


                                                 Diet Order: Cardiac, chopped


                                                 Estimated Energy Needs: (


                                                 Underweight, CBW)


                                                 9726-6135 kcals (30-35 kcals/


                                                 kg)


                                                 50-60g Pro (1.0-1.2 g/kg)


                                                 3366-9795 ml (35-40 ml/kg)


      Current Diet Order/ Nutrition Support      Cardiac, chopped


      Pertinent Medications                      Maalox (PRN), Vitamin C,


                                                 Colace, Ferrous Sulfate, MOM (


                                                 PRN), Theregran


      Pertinent Labs                             : Hgb/Hct 11.6/35.0, Na


                                                 132, BUN/Cr 27/1.1, Alb 3.6


     Nutritional Hx/Data


      Height                                     1.78 m


      Height (Calculated Centimeters)            177.8


      Current Weight (lbs)                       49.895 kg


      Weight (Calculated Kilograms)              49.9


      Weight (Calculated Grams)                  31496.2


      Ideal Body Weight                          166 LB (75.45kg)


      % Ideal Body Weight                        66


      Body Mass Index (BMI)                      15.7


      Weight Status                              Underweight


     GI Symptoms


      Last BM                                    Not noted


      Skin Integrity/Comment:                    Clifford: 17


      Current %PO                                Fair (50-74%)


     Estimated Nutritional Goals


      BEE in Kcals:                              Using Current wt


      Calories/Kcals/Kg                          30-35


      Kcals Calculated                           8463-0598


      Protein:                                   Using Current wt


      Protein g/k.0-1.2


      Protein Calculated                         50-60


      Fluid: ml                                  0123-9520 ml (35-40 ml/kg)


     Nutritional Problem


      1. Problem


       Problem                                   Underweight


       Etiology                                  r/t psychosis


       Signs/Symptoms:                           aeb Pt past behavior of


                                                 refusing meals for consecutive


                                                 days.


     Malnutrition Related to Morbid Obesity


      Malnutrition related to morbid obesity     No


     Intervention/Recommendation


      Comments                                   1. Continue with Cardiac,


                                                 chopped diet as ordered.


                                                 2. Add Ensure Enlive TID (


                                                 completed).


     Expected Outcomes/Goals


      Expected Outcomes/Goals                    1. Continue with Cardiac,


                                                 chopped diet as ordered.


                                                 2. Add Ensure Enlive TID (


                                                 completed).


                                                 3. PO intake to meet 75% of


                                                 nutritional needs.


                                                 4. Monitor PO intake, wt,


                                                 nutrition related labs, and


                                                 skin integrity.


                                                 5. F/U as moderate risk in 3-5


                                                 days, 8/15-

## 2019-08-19 RX ADMIN — HALOPERIDOL LACTATE SCH MG: 2 SOLUTION, CONCENTRATE ORAL at 14:48

## 2019-08-19 RX ADMIN — HALOPERIDOL LACTATE SCH MG: 2 SOLUTION, CONCENTRATE ORAL at 20:41

## 2019-08-19 RX ADMIN — HALOPERIDOL LACTATE SCH MG: 2 SOLUTION, CONCENTRATE ORAL at 08:47

## 2019-08-19 NOTE — INTERNAL MEDICINE PROG NOTE
Internal Medicine Subjective





- Subjective


Patient seen and examined:: with staff, chart reviewed, other (family concerned 

re pt not able to walk, per pt has not been able to walk since )


Patient is:: awake, verbal, interactive, agitated, confused


Per staff patient has:: no adverse event, no episodes of fall, poor appetite, 

confused, tolerating meds





Internal Medicine Objective





- Results


Result Diagrams: 


 19 12:35





 19 12:35


Recent Labs: 


 Laboratory Last Values











WBC  10.3 Th/cmm (4.8-10.8)   19  12:35    


 


RBC  4.00 Mil/cmm (4.30-5.70)  L  19  12:35    


 


Hgb  11.6 gm/dL (12-16)  L  19  12:35    


 


Hct  35.0 % (41.0-60)  L  19  12:35    


 


MCV  87.6 fl (80-99)   19  12:35    


 


MCH  28.9 pg (26.0-30.0)   19  12:35    


 


MCHC Differential  33.0 pg (28.0-36.0)   19  12:35    


 


RDW  15.7 % (11.5-20.0)   19  12:35    


 


Plt Count  335 Th/cmm (150-400)   19  12:35    


 


MPV  8.2 fl  19  12:35    


 


Neutrophils %  63.9 % (40.0-80.0)   19  12:35    


 


Lymphocytes %  21.7 % (20.0-50.0)   19  12:35    


 


Monocytes %  7.4 % (2.0-10.0)   19  12:35    


 


Eosinophils %  5.9 % (0.0-5.0)  H  19  12:35    


 


Basophils %  1.1 % (0.0-2.0)   19  12:35    


 


Sodium  132 mEq/L (136-145)  L  19  12:35    


 


Potassium  4.5 mEq/L (3.5-5.1)   19  12:35    


 


Chloride  102 mEq/L ()   19  12:35    


 


Carbon Dioxide  26.4 mEq/L (21.0-31.0)   19  12:35    


 


Anion Gap  8.1  (7.0-16.0)   19  12:35    


 


BUN  27 mg/dL (7-25)  H  19  12:35    


 


Creatinine  1.1 mg/dL (0.7-1.3)   19  12:35    


 


Est GFR ( Amer)  > 60.0 ml/min (>90)   19  12:35    


 


Est GFR (Non-Af Amer)  > 60.0 ml/min  19  12:35    


 


BUN/Creatinine Ratio  24.5   19  12:35    


 


Glucose  96 mg/dL ()   19  12:35    


 


Calcium  9.7 mg/dL (8.6-10.3)   19  12:35    


 


Total Bilirubin  0.4 mg/dL (0.3-1.0)   19  12:35    


 


AST  16 U/L (13-39)   19  12:35    


 


ALT  13 U/L (7-52)   19  12:35    


 


Alkaline Phosphatase  38 U/L ()   19  12:35    


 


Total Protein  6.4 gm/dL (6.0-8.3)   19  12:35    


 


Albumin  3.6 gm/dL (4.2-5.5)  L  19  12:35    


 


Globulin  2.8 gm/dL  19  12:35    


 


Albumin/Globulin Ratio  1.3  (1.0-1.8)   19  12:35    


 


Triglycerides  86 mg/dL (<150)   19  12:35    


 


Cholesterol  144 mg/dL (<200)   19  12:35    


 


LDL Cholesterol Direct  81 mg/dL ()   19  12:35    


 


HDL Cholesterol  50 mg/dL (23-92)   19  12:35    


 


Lithium  1.18 mmol/L (0.5-1.0)  H  19  12:35    














- Physical Exam


Vitals and I&O: 


 Vital Signs











Temp  97.5 F   19 06:24


 


Pulse  76   19 06:24


 


Resp  18   19 06:24


 


BP  116/73   19 06:24


 


Pulse Ox  98   19 06:24








 Intake & Output











 19





 18:59 06:59 18:59


 


Intake Total 950 240 


 


Balance 950 240 


 


Weight (lbs) 55.021 kg 54.431 kg 


 


Intake:   


 


  Oral 950 240 


 


Other:   


 


  # Voids 3 3 


 


  # Bowel Movements 0 0 


 


  Weight Source Bedscale Bedscale 











Active Medications: 


Current Medications





Acetaminophen (Tylenol)  650 mg PO Q6H PRN


   PRN Reason: Pain / Temp above 100


   Stop: 10/11/19 14:24


Al Hydrox/Mg Hydrox/Simethicone (Maalox)  30 ml PO Q4H PRN


   PRN Reason: GI DISTRESS


   Stop: 10/11/19 14:24


Ascorbic Acid (Vitamin C)  500 mg PO DAILY [facility]


   Stop: 10/12/19 08:59


   Last Admin: 19 08:47 Dose:  500 mg


Clonidine HCl (Catapres-Tts-1)  1 patch TD QTUE [facility]


   Stop: 10/12/19 08:59


   Last Admin: 19 10:42 Dose:  Not Given


Docusate Sodium (Colace)  100 mg PO DAILY [facility]


   Stop: 10/12/19 08:59


   Last Admin: 19 08:47 Dose:  100 mg


Ferrous Sulfate (Iron)  325 mg PO DAILY [facility]


   Stop: 10/12/19 08:59


   Last Admin: 19 08:48 Dose:  325 mg


Haloperidol Lactate (Haldol)  3 mg PO TID [facility]


   Stop: 10/13/19 20:59


   Last Admin: 19 08:47 Dose:  3 mg


Lorazepam (Ativan)  0.5 mg PO Q6H PRN; Protocol


   PRN Reason: Agitation


   Stop: 10/11/19 14:24


   Last Admin: 19 23:08 Dose:  0.5 mg


Magnesium Hydroxide (Milk Of Magnesia)  30 ml PO HS PRN


   PRN Reason: Constipation


   Stop: 10/11/19 14:24


Multivitamins/Vitamin C (Theragran)  1 tab PO DAILY [facility]


   Stop: 10/12/19 08:59


   Last Admin: 19 08:47 Dose:  1 tab


Trazodone HCl (Desyrel)  50 mg PO HS [facility]; Protocol


   Stop: 10/11/19 20:59


   Last Admin: 19 21:24 Dose:  50 mg








General: demented, thin, appears older


HEENT: NC/AT, PERRLA, EOMI


Neck: Supple, No JVD, No thyromegaly, No LAD


Lungs: CTAB


Cardiovascular: RRR, Normal S1, Normal S2


Abdomen: soft, non-tender, thin, non-distended, positive bowel sound


Extremities: excoriation, contracture





Internal Medicine Assmt/Plan





- Assessment


Assessment: 





ASSESSMENT AND PLAN:


1.  Hypertension.


2.  Dehydration.


3.  Renal insufficiency.


4.  Thin built.


5.  History of agitation and psych disorder.


6.  Anemia.


7.  Hyponatremia.


8.  Low albumin.











- Plan


Plan: 








PLAN:  Continue oxygen, bronchodilator treatments.  We will provide the patient


with adequate support with antihypertensive medication.  We will monitor


hemoglobin and hematocrit.  We will review mes  We will continue


monitoring the patient closely.


will refer to PT for eval





Nutritional Asmnt/Malnutr-PDOC





- Dietary Evaluation


Malnutrition Findings (Please click <Entered> for more info): 








Nutritional Asmnt/Malnutrition                             Start:  19 11:

29


Text:                                                      Status: Complete    

  


Freq:                                                                          

  


Protocol:                                                                      

  


 Document     19 11:29  JUSTINE  (Rec: 19 11:33  JUSTINE [email or web address])


 Nutritional Asmnt/Malnutrition


     Patient General Information


      Nutritional Screening                      High Risk


      Diagnosis                                  Psychosis


      Pertinent Medical Hx/Surgical Hx           Psych Disorder, oral ulcer


                                                 disease, COPD


      Subjective Information                     Pt is a 59-year-old male


                                                 admitted on  d/t


                                                 aggressiveness. RN, Vincenzo,


                                                 stated Pt ate well this


                                                 morning, 70% meal and seemed


                                                 to be in a good mood. As Pt is


                                                 underweight and has refused


                                                 meals d/t psychosis in


                                                 previous hospital visits,


                                                 adding Ensure Enlive TID to


                                                 encourage weight gain trending


                                                 towards IBW.


                                                 HT: 510


                                                 WT: 110 LB (50 kg)


                                                 BMI: 15.79 (Underweight)


                                                 GI: Not noted


                                                 BM: Not Noted


                                                 I/O: Not Noted


                                                 Skin: Not noted


                                                 Clifford: 17


                                                 Diet Order: Cardiac, chopped


                                                 Estimated Energy Needs: (


                                                 Underweight, CBW)


                                                 9371-6054 kcals (30-35 kcals/


                                                 kg)


                                                 50-60g Pro (1.0-1.2 g/kg)


                                                 6927-5170 ml (35-40 ml/kg)


      Current Diet Order/ Nutrition Support      Cardiac, chopped


      Pertinent Medications                      Maalox (PRN), Vitamin C,


                                                 Colace, Ferrous Sulfate, MOM (


                                                 PRN), Theregran


      Pertinent Labs                             : Hgb/Hct 11.6/35.0, Na


                                                 132, BUN/Cr 27/1.1, Alb 3.6


     Nutritional Hx/Data


      Height                                     1.78 m


      Height (Calculated Centimeters)            177.8


      Current Weight (lbs)                       49.895 kg


      Weight (Calculated Kilograms)              49.9


      Weight (Calculated Grams)                  37846.2


      Ideal Body Weight                          166 LB (75.45kg)


      % Ideal Body Weight                        66


      Body Mass Index (BMI)                      15.7


      Weight Status                              Underweight


     GI Symptoms


      Last BM                                    Not noted


      Skin Integrity/Comment:                    Clifford: 17


      Current %PO                                Fair (50-74%)


     Estimated Nutritional Goals


      BEE in Kcals:                              Using Current wt


      Calories/Kcals/Kg                          30-35


      Kcals Calculated                           8048-3012


      Protein:                                   Using Current wt


      Protein g/k.0-1.2


      Protein Calculated                         50-60


      Fluid: ml                                  4616-8911 ml (35-40 ml/kg)


     Nutritional Problem


      1. Problem


       Problem                                   Underweight


       Etiology                                  r/t psychosis


       Signs/Symptoms:                           aeb Pt past behavior of


                                                 refusing meals for consecutive


                                                 days.


     Malnutrition Related to Morbid Obesity


      Malnutrition related to morbid obesity     No


     Intervention/Recommendation


      Comments                                   1. Continue with Cardiac,


                                                 chopped diet as ordered.


                                                 2. Add Ensure Enlive TID (


                                                 completed).


     Expected Outcomes/Goals


      Expected Outcomes/Goals                    1. Continue with Cardiac,


                                                 chopped diet as ordered.


                                                 2. Add Ensure Enlive TID (


                                                 completed).


                                                 3. PO intake to meet 75% of


                                                 nutritional needs.


                                                 4. Monitor PO intake, wt,


                                                 nutrition related labs, and


                                                 skin integrity.


                                                 5. F/U as moderate risk in 3-5


                                                 days, 8/15-

## 2019-08-20 RX ADMIN — CLONIDINE SCH: 0.1 PATCH TRANSDERMAL at 09:57

## 2019-08-20 RX ADMIN — HALOPERIDOL LACTATE SCH MG: 2 SOLUTION, CONCENTRATE ORAL at 20:10

## 2019-08-20 RX ADMIN — HALOPERIDOL LACTATE SCH MG: 2 SOLUTION, CONCENTRATE ORAL at 09:38

## 2019-08-20 RX ADMIN — HALOPERIDOL LACTATE SCH MG: 2 SOLUTION, CONCENTRATE ORAL at 14:44

## 2019-08-20 NOTE — INTERNAL MEDICINE PROG NOTE
Internal Medicine Subjective





- Subjective


Patient seen and examined:: with staff, chart reviewed


Patient is:: awake, verbal, interactive, agitated, confused


Per staff patient has:: no adverse event, no episodes of fall, poor appetite, 

confused, tolerating meds





Internal Medicine Objective





- Results


Result Diagrams: 


 19 12:35





 19 12:35


Recent Labs: 


 Laboratory Last Values











WBC  10.3 Th/cmm (4.8-10.8)   19  12:35    


 


RBC  4.00 Mil/cmm (4.30-5.70)  L  19  12:35    


 


Hgb  11.6 gm/dL (12-16)  L  19  12:35    


 


Hct  35.0 % (41.0-60)  L  19  12:35    


 


MCV  87.6 fl (80-99)   19  12:35    


 


MCH  28.9 pg (26.0-30.0)   19  12:35    


 


MCHC Differential  33.0 pg (28.0-36.0)   19  12:35    


 


RDW  15.7 % (11.5-20.0)   19  12:35    


 


Plt Count  335 Th/cmm (150-400)   19  12:35    


 


MPV  8.2 fl  19  12:35    


 


Neutrophils %  63.9 % (40.0-80.0)   19  12:35    


 


Lymphocytes %  21.7 % (20.0-50.0)   19  12:35    


 


Monocytes %  7.4 % (2.0-10.0)   19  12:35    


 


Eosinophils %  5.9 % (0.0-5.0)  H  19  12:35    


 


Basophils %  1.1 % (0.0-2.0)   19  12:35    


 


Sodium  132 mEq/L (136-145)  L  19  12:35    


 


Potassium  4.5 mEq/L (3.5-5.1)   19  12:35    


 


Chloride  102 mEq/L ()   19  12:35    


 


Carbon Dioxide  26.4 mEq/L (21.0-31.0)   19  12:35    


 


Anion Gap  8.1  (7.0-16.0)   19  12:35    


 


BUN  27 mg/dL (7-25)  H  19  12:35    


 


Creatinine  1.1 mg/dL (0.7-1.3)   19  12:35    


 


Est GFR ( Amer)  > 60.0 ml/min (>90)   19  12:35    


 


Est GFR (Non-Af Amer)  > 60.0 ml/min  19  12:35    


 


BUN/Creatinine Ratio  24.5   19  12:35    


 


Glucose  96 mg/dL ()   19  12:35    


 


Calcium  9.7 mg/dL (8.6-10.3)   19  12:35    


 


Total Bilirubin  0.4 mg/dL (0.3-1.0)   19  12:35    


 


AST  16 U/L (13-39)   19  12:35    


 


ALT  13 U/L (7-52)   19  12:35    


 


Alkaline Phosphatase  38 U/L ()   19  12:35    


 


Total Protein  6.4 gm/dL (6.0-8.3)   19  12:35    


 


Albumin  3.6 gm/dL (4.2-5.5)  L  19  12:35    


 


Globulin  2.8 gm/dL  19  12:35    


 


Albumin/Globulin Ratio  1.3  (1.0-1.8)   19  12:35    


 


Triglycerides  86 mg/dL (<150)   19  12:35    


 


Cholesterol  144 mg/dL (<200)   19  12:35    


 


LDL Cholesterol Direct  81 mg/dL ()   19  12:35    


 


HDL Cholesterol  50 mg/dL (23-92)   19  12:35    


 


Lithium  1.18 mmol/L (0.5-1.0)  H  19  12:35    














- Physical Exam


Vitals and I&O: 


 Vital Signs











Temp  98.5 F   19 05:43


 


Pulse  95   19 09:57


 


Resp  20   19 05:43


 


BP  104/64   19 09:57


 


Pulse Ox  99   19 05:43








 Intake & Output











 19





 18:59 06:59 18:59


 


Intake Total 1400 120 


 


Balance 1400 120 


 


Weight (lbs) 56.699 kg 56.245 kg 


 


Intake:   


 


  Oral 1400 120 


 


Other:   


 


  # Voids 4 4 


 


  # Bowel Movements 1 0 


 


  Weight Source Bedscale Bedscale 











Active Medications: 


Current Medications





Acetaminophen (Tylenol)  650 mg PO Q6H PRN


   PRN Reason: Pain / Temp above 100


   Stop: 10/11/19 14:24


Al Hydrox/Mg Hydrox/Simethicone (Maalox)  30 ml PO Q4H PRN


   PRN Reason: GI DISTRESS


   Stop: 10/11/19 14:24


Ascorbic Acid (Vitamin C)  500 mg PO DAILY [facility]


   Stop: 10/12/19 08:59


   Last Admin: 19 09:38 Dose:  500 mg


Clonidine HCl (Catapres-Tts-1)  1 patch TD QTUE [facility]


   Stop: 10/12/19 08:59


   Last Admin: 19 09:57 Dose:  Not Given


Docusate Sodium (Colace)  100 mg PO DAILY [facility]


   Stop: 10/12/19 08:59


   Last Admin: 19 09:38 Dose:  100 mg


Ferrous Sulfate (Iron)  325 mg PO DAILY [facility]


   Stop: 10/12/19 08:59


   Last Admin: 19 09:38 Dose:  325 mg


Haloperidol Lactate (Haldol)  3 mg PO TID [facility]


   Stop: 10/13/19 20:59


   Last Admin: 19 09:38 Dose:  3 mg


Lorazepam (Ativan)  0.5 mg PO Q6H PRN; Protocol


   PRN Reason: Agitation


   Stop: 10/11/19 14:24


   Last Admin: 19 23:08 Dose:  0.5 mg


Magnesium Hydroxide (Milk Of Magnesia)  30 ml PO HS PRN


   PRN Reason: Constipation


   Stop: 10/11/19 14:24


Multivitamins/Vitamin C (Theragran)  1 tab PO DAILY [facility]


   Stop: 10/12/19 08:59


   Last Admin: 19 09:38 Dose:  1 tab


Trazodone HCl (Desyrel)  50 mg PO HS [facility]; Protocol


   Stop: 10/11/19 20:59


   Last Admin: 19 20:41 Dose:  50 mg








General: demented, thin, appears older


HEENT: NC/AT, PERRLA, EOMI


Neck: Supple, No JVD, No thyromegaly, No LAD


Lungs: CTAB


Cardiovascular: RRR, Normal S1, Normal S2


Abdomen: soft, non-tender, thin, non-distended, positive bowel sound


Extremities: excoriation, contracture





Internal Medicine Assmt/Plan





- Assessment


Assessment: 





ASSESSMENT AND PLAN:


1.  Hypertension.


2.  Dehydration.


3.  Renal insufficiency.


4.  Thin built.


5.  History of agitation and psych disorder.


6.  Anemia.


7.  Hyponatremia.


8.  Low albumin.











- Plan


Plan: 








PLAN:  Continue oxygen, bronchodilator treatments.  We will provide the patient


with adequate support with antihypertensive medication.  We will monitor


hemoglobin and hematocrit.  We will review meds  We will continue


monitoring the patient closely.


will refer to PT for eval





Nutritional Asmnt/Malnutr-PDOC





- Dietary Evaluation


Malnutrition Findings (Please click <Entered> for more info): 








Nutritional Asmnt/Malnutrition                             Start:  19 11:

29


Text:                                                      Status: Complete    

  


Freq:                                                                          

  


Protocol:                                                                      

  


 Document     19 11:29  JUSTINE  (Rec: 19 11:33  JUSTINE  [id number])


 Nutritional Asmnt/Malnutrition


     Patient General Information


      Nutritional Screening                      High Risk


      Diagnosis                                  Psychosis


      Pertinent Medical Hx/Surgical Hx           Psych Disorder, oral ulcer


                                                 disease, COPD


      Subjective Information                     Pt is a 59-year-old male


                                                 admitted on  d/t


                                                 aggressiveness. RN, Vincenzo,


                                                 stated Pt ate well this


                                                 morning, 70% meal and seemed


                                                 to be in a good mood. As Pt is


                                                 underweight and has refused


                                                 meals d/t psychosis in


                                                 previous hospital visits,


                                                 adding Ensure Enlive TID to


                                                 encourage weight gain trending


                                                 towards IBW.


                                                 HT: 510


                                                 WT: 110 LB (50 kg)


                                                 BMI: 15.79 (Underweight)


                                                 GI: Not noted


                                                 BM: Not Noted


                                                 I/O: Not Noted


                                                 Skin: Not noted


                                                 Clifford: 17


                                                 Diet Order: Cardiac, chopped


                                                 Estimated Energy Needs: (


                                                 Underweight, CBW)


                                                 3028-6181 kcals (30-35 kcals/


                                                 kg)


                                                 50-60g Pro (1.0-1.2 g/kg)


                                                 0886-7324 ml (35-40 ml/kg)


      Current Diet Order/ Nutrition Support      Cardiac, chopped


      Pertinent Medications                      Maalox (PRN), Vitamin C,


                                                 Colace, Ferrous Sulfate, MOM (


                                                 PRN), Theregran


      Pertinent Labs                             : Hgb/Hct 11.6/35.0, Na


                                                 132, BUN/Cr 27/1.1, Alb 3.6


     Nutritional Hx/Data


      Height                                     1.78 m


      Height (Calculated Centimeters)            177.8


      Current Weight (lbs)                       49.895 kg


      Weight (Calculated Kilograms)              49.9


      Weight (Calculated Grams)                  41555.2


      Ideal Body Weight                          166 LB (75.45kg)


      % Ideal Body Weight                        66


      Body Mass Index (BMI)                      15.7


      Weight Status                              Underweight


     GI Symptoms


      Last BM                                    Not noted


      Skin Integrity/Comment:                    Clifford: 17


      Current %PO                                Fair (50-74%)


     Estimated Nutritional Goals


      BEE in Kcals:                              Using Current wt


      Calories/Kcals/Kg                          30-35


      Kcals Calculated                           9110-9271


      Protein:                                   Using Current wt


      Protein g/k.0-1.2


      Protein Calculated                         50-60


      Fluid: ml                                  2328-1075 ml (35-40 ml/kg)


     Nutritional Problem


      1. Problem


       Problem                                   Underweight


       Etiology                                  r/t psychosis


       Signs/Symptoms:                           aeb Pt past behavior of


                                                 refusing meals for consecutive


                                                 days.


     Malnutrition Related to Morbid Obesity


      Malnutrition related to morbid obesity     No


     Intervention/Recommendation


      Comments                                   1. Continue with Cardiac,


                                                 chopped diet as ordered.


                                                 2. Add Ensure Enlive TID (


                                                 completed).


     Expected Outcomes/Goals


      Expected Outcomes/Goals                    1. Continue with Cardiac,


                                                 chopped diet as ordered.


                                                 2. Add Ensure Enlive TID (


                                                 completed).


                                                 3. PO intake to meet 75% of


                                                 nutritional needs.


                                                 4. Monitor PO intake, wt,


                                                 nutrition related labs, and


                                                 skin integrity.


                                                 5. F/U as moderate risk in 3-5


                                                 days, 8/15-

## 2019-08-20 NOTE — PROGRESS NOTES
DATE:  08/19/2019



SUBJECTIVE:  The patient remains unpredictable, yelling, screaming.  "No, no."

 "Out"  "Get the fuck out."  Angry, upset, impulsive, unpredictable.  On a

positive note, he is taking his medications, no side effects, mostly keeps to

self, needing a higher level of prompting, redirection, ongoing safety concerns.



PLAN:  We will continue to monitor.  Medications were noted.





DD: 08/19/2019 16:33

DT: 08/19/2019 22:24

[facility]# [id number]  [id number]

## 2019-08-21 RX ADMIN — HALOPERIDOL LACTATE SCH MG: 2 SOLUTION, CONCENTRATE ORAL at 09:14

## 2019-08-21 RX ADMIN — HALOPERIDOL LACTATE SCH MG: 2 SOLUTION, CONCENTRATE ORAL at 20:52

## 2019-08-21 RX ADMIN — HALOPERIDOL LACTATE SCH MG: 2 SOLUTION, CONCENTRATE ORAL at 13:23

## 2019-08-21 NOTE — INTERNAL MEDICINE PROG NOTE
Internal Medicine Subjective





- Subjective


Patient seen and examined:: with staff, chart reviewed


Patient is:: awake, verbal, interactive, agitated, confused


Per staff patient has:: no adverse event, no episodes of fall, poor appetite, 

confused, tolerating meds





Internal Medicine Objective





- Results


Result Diagrams: 


 19 12:35





 19 12:35


Recent Labs: 


 Laboratory Last Values











WBC  10.3 Th/cmm (4.8-10.8)   19  12:35    


 


RBC  4.00 Mil/cmm (4.30-5.70)  L  19  12:35    


 


Hgb  11.6 gm/dL (12-16)  L  19  12:35    


 


Hct  35.0 % (41.0-60)  L  19  12:35    


 


MCV  87.6 fl (80-99)   19  12:35    


 


MCH  28.9 pg (26.0-30.0)   19  12:35    


 


MCHC Differential  33.0 pg (28.0-36.0)   19  12:35    


 


RDW  15.7 % (11.5-20.0)   19  12:35    


 


Plt Count  335 Th/cmm (150-400)   19  12:35    


 


MPV  8.2 fl  19  12:35    


 


Neutrophils %  63.9 % (40.0-80.0)   19  12:35    


 


Lymphocytes %  21.7 % (20.0-50.0)   19  12:35    


 


Monocytes %  7.4 % (2.0-10.0)   19  12:35    


 


Eosinophils %  5.9 % (0.0-5.0)  H  19  12:35    


 


Basophils %  1.1 % (0.0-2.0)   19  12:35    


 


Sodium  132 mEq/L (136-145)  L  19  12:35    


 


Potassium  4.5 mEq/L (3.5-5.1)   19  12:35    


 


Chloride  102 mEq/L ()   19  12:35    


 


Carbon Dioxide  26.4 mEq/L (21.0-31.0)   19  12:35    


 


Anion Gap  8.1  (7.0-16.0)   19  12:35    


 


BUN  27 mg/dL (7-25)  H  19  12:35    


 


Creatinine  1.1 mg/dL (0.7-1.3)   19  12:35    


 


Est GFR ( Amer)  > 60.0 ml/min (>90)   19  12:35    


 


Est GFR (Non-Af Amer)  > 60.0 ml/min  19  12:35    


 


BUN/Creatinine Ratio  24.5   19  12:35    


 


Glucose  96 mg/dL ()   19  12:35    


 


Calcium  9.7 mg/dL (8.6-10.3)   19  12:35    


 


Total Bilirubin  0.4 mg/dL (0.3-1.0)   19  12:35    


 


AST  16 U/L (13-39)   19  12:35    


 


ALT  13 U/L (7-52)   19  12:35    


 


Alkaline Phosphatase  38 U/L ()   19  12:35    


 


Total Protein  6.4 gm/dL (6.0-8.3)   19  12:35    


 


Albumin  3.6 gm/dL (4.2-5.5)  L  19  12:35    


 


Globulin  2.8 gm/dL  19  12:35    


 


Albumin/Globulin Ratio  1.3  (1.0-1.8)   19  12:35    


 


Triglycerides  86 mg/dL (<150)   19  12:35    


 


Cholesterol  144 mg/dL (<200)   19  12:35    


 


LDL Cholesterol Direct  81 mg/dL ()   19  12:35    


 


HDL Cholesterol  50 mg/dL (23-92)   19  12:35    


 


Lithium  1.18 mmol/L (0.5-1.0)  H  19  12:35    














- Physical Exam


Vitals and I&O: 


 Vital Signs











Temp  97.3 F   19 06:25


 


Pulse  72   19 06:25


 


Resp  18   19 06:25


 


BP  111/72   19 06:25


 


Pulse Ox  98   19 06:25








 Intake & Output











 19





 18:59 06:59 18:59


 


Intake Total  120 


 


Balance  120 


 


Weight (lbs)  56.245 kg 


 


Intake:   


 


  Oral  120 


 


Other:   


 


  # Voids  3 


 


  Weight Source  Bedscale 











Active Medications: 


Current Medications





Acetaminophen (Tylenol)  650 mg PO Q6H PRN


   PRN Reason: Pain / Temp above 100


   Stop: 10/11/19 14:24


Al Hydrox/Mg Hydrox/Simethicone (Maalox)  30 ml PO Q4H PRN


   PRN Reason: GI DISTRESS


   Stop: 10/11/19 14:24


Ascorbic Acid (Vitamin C)  500 mg PO DAILY GABY


   Stop: 10/12/19 08:59


   Last Admin: 19 09:13 Dose:  500 mg


Clonidine HCl (Catapres-Tts-1)  1 patch TD QTUE GABY


   Stop: 10/12/19 08:59


   Last Admin: 19 09:57 Dose:  Not Given


Docusate Sodium (Colace)  100 mg PO DAILY [facility]


   Stop: 10/12/19 08:59


   Last Admin: 19 09:13 Dose:  100 mg


Ferrous Sulfate (Iron)  325 mg PO DAILY G[facility]Y


   Stop: 10/12/19 08:59


   Last Admin: 19 09:13 Dose:  325 mg


Haloperidol Lactate (Haldol)  3 mg PO TID GABY


   Stop: 10/13/19 20:59


   Last Admin: 19 09:14 Dose:  3 mg


Lorazepam (Ativan)  0.5 mg PO Q6H PRN; Protocol


   PRN Reason: Agitation


   Stop: 10/11/19 14:24


   Last Admin: 19 23:08 Dose:  0.5 mg


Magnesium Hydroxide (Milk Of Magnesia)  30 ml PO HS PRN


   PRN Reason: Constipation


   Stop: 10/11/19 14:24


Multivitamins/Vitamin C (Theragran)  1 tab PO DAILY [facility]Y


   Stop: 10/12/19 08:59


   Last Admin: 19 09:13 Dose:  1 tab


Trazodone HCl (Desyrel)  50 mg PO HS GABY; Protocol


   Stop: 10/11/19 20:59


   Last Admin: 19 20:09 Dose:  50 mg








General: demented, thin, appears older


HEENT: NC/AT, PERRLA, EOMI


Neck: Supple, No JVD, No thyromegaly, No LAD


Lungs: CTAB


Cardiovascular: RRR, Normal S1, Normal S2


Abdomen: soft, non-tender, thin, non-distended, positive bowel sound


Extremities: excoriation, contracture





Internal Medicine Assmt/Plan





- Assessment


Assessment: 





ASSESSMENT AND PLAN:


1.  Hypertension.


2.  Dehydration.


3.  Renal insufficiency.


4.  Thin built.


5.  History of agitation and psych disorder.


6.  Anemia.


7.  Hyponatremia.


8.  Low albumin.











- Plan


Plan: 








PLAN:  Continue oxygen, bronchodilator treatments.  We will provide the patient


with adequate support with antihypertensive medication.  We will monitor


hemoglobin and hematocrit.  We will review meds  We will continue


monitoring the patient closely.


will refer to PT for eval





Nutritional Asmnt/Malnutr-PDOC





- Dietary Evaluation


Malnutrition Findings (Please click <Entered> for more info): 








Nutritional Asmnt/Malnutrition                             Start:  19 11:

29


Text:                                                      Status: Complete    

  


Freq:                                                                          

  


Protocol:                                                                      

  


 Document     19 11:29  JUSTINE  (Rec: 19 11:33  JUSTINE  [id number])


 Nutritional Asmnt/Malnutrition


     Patient General Information


      Nutritional Screening                      High Risk


      Diagnosis                                  Psychosis


      Pertinent Medical Hx/Surgical Hx           Psych Disorder, oral ulcer


                                                 disease, COPD


      Subjective Information                     Pt is a 59-year-old male


                                                 admitted on  d/t


                                                 aggressiveness. RN, Vincenzo,


                                                 stated Pt ate well this


                                                 morning, 70% meal and seemed


                                                 to be in a good mood. As Pt is


                                                 underweight and has refused


                                                 meals d/t psychosis in


                                                 previous hospital visits,


                                                 adding Ensure Enlive TID to


                                                 encourage weight gain trending


                                                 towards IBW.


                                                 HT: 510


                                                 WT: 110 LB (50 kg)


                                                 BMI: 15.79 (Underweight)


                                                 GI: Not noted


                                                 BM: Not Noted


                                                 I/O: Not Noted


                                                 Skin: Not noted


                                                 Clifford: 17


                                                 Diet Order: Cardiac, chopped


                                                 Estimated Energy Needs: (


                                                 Underweight, CBW)


                                                 1208-5339 kcals (30-35 kcals/


                                                 kg)


                                                 50-60g Pro (1.0-1.2 g/kg)


                                                 9506-9784 ml (35-40 ml/kg)


      Current Diet Order/ Nutrition Support      Cardiac, chopped


      Pertinent Medications                      Maalox (PRN), Vitamin C,


                                                 Colace, Ferrous Sulfate, MOM (


                                                 PRN), Theregran


      Pertinent Labs                             : Hgb/Hct 11.6/35.0, Na


                                                 132, BUN/Cr 27/1.1, Alb 3.6


     Nutritional Hx/Data


      Height                                     1.78 m


      Height (Calculated Centimeters)            177.8


      Current Weight (lbs)                       49.895 kg


      Weight (Calculated Kilograms)              49.9


      Weight (Calculated Grams)                  36037.2


      Ideal Body Weight                          166 LB (75.45kg)


      % Ideal Body Weight                        66


      Body Mass Index (BMI)                      15.7


      Weight Status                              Underweight


     GI Symptoms


      Last BM                                    Not noted


      Skin Integrity/Comment:                    Clifofrd: 17


      Current %PO                                Fair (50-74%)


     Estimated Nutritional Goals


      BEE in Kcals:                              Using Current wt


      Calories/Kcals/Kg                          30-35


      Kcals Calculated                           9455-1203


      Protein:                                   Using Current wt


      Protein g/k.0-1.2


      Protein Calculated                         50-60


      Fluid: ml                                  2231-1859 ml (35-40 ml/kg)


     Nutritional Problem


      1. Problem


       Problem                                   Underweight


       Etiology                                  r/t psychosis


       Signs/Symptoms:                           aeb Pt past behavior of


                                                 refusing meals for consecutive


                                                 days.


     Malnutrition Related to Morbid Obesity


      Malnutrition related to morbid obesity     No


     Intervention/Recommendation


      Comments                                   1. Continue with Cardiac,


                                                 chopped diet as ordered.


                                                 2. Add Ensure Enlive TID (


                                                 completed).


     Expected Outcomes/Goals


      Expected Outcomes/Goals                    1. Continue with Cardiac,


                                                 chopped diet as ordered.


                                                 2. Add Ensure Enlive TID (


                                                 completed).


                                                 3. PO intake to meet 75% of


                                                 nutritional needs.


                                                 4. Monitor PO intake, wt,


                                                 nutrition related labs, and


                                                 skin integrity.


                                                 5. F/U as moderate risk in 3-5


                                                 days, 8/15-

## 2019-08-21 NOTE — PROGRESS NOTES
DATE:  08/20/2019



SUBJECTIVE:  The patient is confused, disoriented.  He is calmer today, just

stating, "I'm taking care of myself," I'm taking care of myself," "I'm

taking care of myself," fewer outbursts, still very confused, disoriented,

disengaged.  Mental stability seems to be better, but he does remain impulsive. 

It is unclear if he was going to have any outbursts.  Medications were reviewed

per .  The patient is going to [facility].





DD: 08/20/2019 14:26

DT: 08/20/2019 20:58

JOB# [id number]  [id number]

## 2019-08-21 NOTE — PROGRESS NOTES
DATE:  08/21/2019



SUBJECTIVE:  The patient was seen, chart reviewed, discussed with staff.  The

patient in the hospital, still bizarre, stating, "no, no, no" "no, no, no"

"you know my doctor" "you know my doctor."  Confused, disoriented, but

generally calmer, still gets upset, angry at times, lashes out at staff at

times, bizarre symptoms, ideations.



ASSESSMENT:  The patient remains bizarre, ongoing behavioral disturbances, still

impulsive, unpredictable.



PLAN:  We will continue to monitor.  The patient is doing somewhat better. 

Seems to be doing better with increased dose of Haldol.





DD: 08/21/2019 11:46

DT: 08/21/2019 21:33

[facility]# [id number]  [id number]

## 2019-08-22 RX ADMIN — HALOPERIDOL LACTATE SCH MG: 2 SOLUTION, CONCENTRATE ORAL at 20:22

## 2019-08-22 RX ADMIN — HALOPERIDOL LACTATE SCH MG: 2 SOLUTION, CONCENTRATE ORAL at 14:00

## 2019-08-22 RX ADMIN — HALOPERIDOL LACTATE SCH MG: 2 SOLUTION, CONCENTRATE ORAL at 09:03

## 2019-08-22 NOTE — INTERNAL MEDICINE PROG NOTE
Internal Medicine Subjective





- Subjective


Patient seen and examined:: with staff, chart reviewed


Patient is:: awake, verbal, interactive, agitated, confused


Per staff patient has:: no adverse event, no episodes of fall, poor appetite, 

confused, tolerating meds





Internal Medicine Objective





- Results


Result Diagrams: 


 19 12:35





 19 12:35


Recent Labs: 


 Laboratory Last Values











WBC  10.3 Th/cmm (4.8-10.8)   19  12:35    


 


RBC  4.00 Mil/cmm (4.30-5.70)  L  19  12:35    


 


Hgb  11.6 gm/dL (12-16)  L  19  12:35    


 


Hct  35.0 % (41.0-60)  L  19  12:35    


 


MCV  87.6 fl (80-99)   19  12:35    


 


MCH  28.9 pg (26.0-30.0)   19  12:35    


 


MCHC Differential  33.0 pg (28.0-36.0)   19  12:35    


 


RDW  15.7 % (11.5-20.0)   19  12:35    


 


Plt Count  335 Th/cmm (150-400)   19  12:35    


 


MPV  8.2 fl  19  12:35    


 


Neutrophils %  63.9 % (40.0-80.0)   19  12:35    


 


Lymphocytes %  21.7 % (20.0-50.0)   19  12:35    


 


Monocytes %  7.4 % (2.0-10.0)   19  12:35    


 


Eosinophils %  5.9 % (0.0-5.0)  H  19  12:35    


 


Basophils %  1.1 % (0.0-2.0)   19  12:35    


 


Sodium  132 mEq/L (136-145)  L  19  12:35    


 


Potassium  4.5 mEq/L (3.5-5.1)   19  12:35    


 


Chloride  102 mEq/L ()   19  12:35    


 


Carbon Dioxide  26.4 mEq/L (21.0-31.0)   19  12:35    


 


Anion Gap  8.1  (7.0-16.0)   19  12:35    


 


BUN  27 mg/dL (7-25)  H  19  12:35    


 


Creatinine  1.1 mg/dL (0.7-1.3)   19  12:35    


 


Est GFR ( Amer)  > 60.0 ml/min (>90)   19  12:35    


 


Est GFR (Non-Af Amer)  > 60.0 ml/min  19  12:35    


 


BUN/Creatinine Ratio  24.5   19  12:35    


 


Glucose  96 mg/dL ()   19  12:35    


 


Calcium  9.7 mg/dL (8.6-10.3)   19  12:35    


 


Total Bilirubin  0.4 mg/dL (0.3-1.0)   19  12:35    


 


AST  16 U/L (13-39)   19  12:35    


 


ALT  13 U/L (7-52)   19  12:35    


 


Alkaline Phosphatase  38 U/L ()   19  12:35    


 


Total Protein  6.4 gm/dL (6.0-8.3)   19  12:35    


 


Albumin  3.6 gm/dL (4.2-5.5)  L  19  12:35    


 


Globulin  2.8 gm/dL  19  12:35    


 


Albumin/Globulin Ratio  1.3  (1.0-1.8)   19  12:35    


 


Triglycerides  86 mg/dL (<150)   19  12:35    


 


Cholesterol  144 mg/dL (<200)   19  12:35    


 


LDL Cholesterol Direct  81 mg/dL ()   19  12:35    


 


HDL Cholesterol  50 mg/dL (23-92)   19  12:35    


 


Lithium  1.18 mmol/L (0.5-1.0)  H  19  12:35    














- Physical Exam


Vitals and I&O: 


 Vital Signs











Temp  98.2 F   19 06:33


 


Pulse  73   19 06:33


 


Resp  18   19 06:33


 


BP  107/60   19 06:33


 


Pulse Ox  97   19 06:33








 Intake & Output











 19





 18:59 06:59 18:59


 


Intake Total 1200 120 


 


Balance 1200 120 


 


Weight (lbs) 45.359 kg 54.431 kg 


 


Intake:   


 


  Oral 1200 120 


 


Other:   


 


  # Voids  3 


 


  # Bowel Movements 2 1 


 


  Weight Source Estimated Bedscale 











Active Medications: 


Current Medications





Acetaminophen (Tylenol)  650 mg PO Q6H PRN


   PRN Reason: Pain / Temp above 100


   Stop: 10/11/19 14:24


Al Hydrox/Mg Hydrox/Simethicone (Maalox)  30 ml PO Q4H PRN


   PRN Reason: GI DISTRESS


   Stop: 10/11/19 14:24


Ascorbic Acid (Vitamin C)  500 mg PO DAILY [facility]


   Stop: 10/12/19 08:59


   Last Admin: 19 09:03 Dose:  500 mg


Clonidine HCl (Catapres-Tts-1)  1 patch TD QTUE GABY


   Stop: 10/12/19 08:59


   Last Admin: 19 09:57 Dose:  Not Given


Docusate Sodium (Colace)  100 mg PO DAILY [facility]


   Stop: 10/12/19 08:59


   Last Admin: 19 09:03 Dose:  100 mg


Ferrous Sulfate (Iron)  325 mg PO DAILY GABY


   Stop: 10/12/19 08:59


   Last Admin: 19 09:03 Dose:  325 mg


Haloperidol Lactate (Haldol)  3 mg PO TID GABY


   Stop: 10/13/19 20:59


   Last Admin: 19 09:03 Dose:  3 mg


Lorazepam (Ativan)  0.5 mg PO Q6H PRN; Protocol


   PRN Reason: Agitation


   Stop: 10/11/19 14:24


   Last Admin: 19 23:08 Dose:  0.5 mg


Magnesium Hydroxide (Milk Of Magnesia)  30 ml PO HS PRN


   PRN Reason: Constipation


   Stop: 10/11/19 14:24


Multivitamins/Vitamin C (Theragran)  1 tab PO DAILY GABY


   Stop: 10/12/19 08:59


   Last Admin: 19 09:03 Dose:  1 tab


Trazodone HCl (Desyrel)  50 mg PO HS GABY; Protocol


   Stop: 10/11/19 20:59


   Last Admin: 19 20:52 Dose:  50 mg








General: demented, thin, appears older


HEENT: NC/AT, PERRLA, EOMI


Neck: Supple, No JVD, No thyromegaly, No LAD


Lungs: CTAB


Cardiovascular: RRR, Normal S1, Normal S2


Abdomen: soft, non-tender, thin, non-distended, positive bowel sound


Extremities: excoriation, contracture





Internal Medicine Assmt/Plan





- Assessment


Assessment: 





ASSESSMENT AND PLAN:


1.  Hypertension.


2.  Dehydration.


3.  Renal insufficiency.


4.  Thin built.


5.  History of agitation and psych disorder.


6.  Anemia.


7.  Hyponatremia.


8.  Low albumin.











- Plan


Plan: 








PLAN:  Continue oxygen, bronchodilator treatments.  We will provide the patient


with adequate support with antihypertensive medication.  We will monitor


hemoglobin and hematocrit.  We will review meds  We will continue


monitoring the patient closely.


will refer to PT for eval





Nutritional Asmnt/Malnutr-PDOC





- Dietary Evaluation


Malnutrition Findings (Please click <Entered> for more info): 








Nutritional Asmnt/Malnutrition                             Start:  19 11:

29


Text:                                                      Status: Complete    

  


Freq:                                                                          

  


Protocol:                                                                      

  


 Document     19 11:29  JUSTINE  (Rec: 19 11:33  JUSITNE  [id number])


 Nutritional Asmnt/Malnutrition


     Patient General Information


      Nutritional Screening                      High Risk


      Diagnosis                                  Psychosis


      Pertinent Medical Hx/Surgical Hx           Psych Disorder, oral ulcer


                                                 disease, COPD


      Subjective Information                     Pt is a 59-year-old male


                                                 admitted on  d/t


                                                 aggressiveness. RNVincenzo,


                                                 stated Pt ate well this


                                                 morning, 70% meal and seemed


                                                 to be in a good mood. As Pt is


                                                 underweight and has refused


                                                 meals d/t psychosis in


                                                 previous hospital visits,


                                                 adding Ensure Enlive TID to


                                                 encourage weight gain trending


                                                 towards IBW.


                                                 HT: 510


                                                 WT: 110 LB (50 kg)


                                                 BMI: 15.79 (Underweight)


                                                 GI: Not noted


                                                 BM: Not Noted


                                                 I/O: Not Noted


                                                 Skin: Not noted


                                                 Clifford: 17


                                                 Diet Order: Cardiac, chopped


                                                 Estimated Energy Needs: (


                                                 Underweight, CBW)


                                                 4283-2257 kcals (30-35 kcals/


                                                 kg)


                                                 50-60g Pro (1.0-1.2 g/kg)


                                                 6865-2261 ml (35-40 ml/kg)


      Current Diet Order/ Nutrition Support      Cardiac, chopped


      Pertinent Medications                      Maalox (PRN), Vitamin C,


                                                 Colace, Ferrous Sulfate, MOM (


                                                 PRN), Theregran


      Pertinent Labs                             : Hgb/Hct 11.6/35.0, Na


                                                 132, BUN/Cr 27/1.1, Alb 3.6


     Nutritional Hx/Data


      Height                                     1.78 m


      Height (Calculated Centimeters)            177.8


      Current Weight (lbs)                       49.895 kg


      Weight (Calculated Kilograms)              49.9


      Weight (Calculated Grams)                  17812.2


      Ideal Body Weight                          166 LB (75.45kg)


      % Ideal Body Weight                        66


      Body Mass Index (BMI)                      15.7


      Weight Status                              Underweight


     GI Symptoms


      Last BM                                    Not noted


      Skin Integrity/Comment:                    Clifford: 17


      Current %PO                                Fair (50-74%)


     Estimated Nutritional Goals


      BEE in Kcals:                              Using Current wt


      Calories/Kcals/Kg                          30-35


      Kcals Calculated                           8268-4674


      Protein:                                   Using Current wt


      Protein g/k.0-1.2


      Protein Calculated                         50-60


      Fluid: ml                                  5291-4270 ml (35-40 ml/kg)


     Nutritional Problem


      1. Problem


       Problem                                   Underweight


       Etiology                                  r/t psychosis


       Signs/Symptoms:                           aeb Pt past behavior of


                                                 refusing meals for consecutive


                                                 days.


     Malnutrition Related to Morbid Obesity


      Malnutrition related to morbid obesity     No


     Intervention/Recommendation


      Comments                                   1. Continue with Cardiac,


                                                 chopped diet as ordered.


                                                 2. Add Ensure Enlive TID (


                                                 completed).


     Expected Outcomes/Goals


      Expected Outcomes/Goals                    1. Continue with Cardiac,


                                                 chopped diet as ordered.


                                                 2. Add Ensure Enlive TID (


                                                 completed).


                                                 3. PO intake to meet 75% of


                                                 nutritional needs.


                                                 4. Monitor PO intake, wt,


                                                 nutrition related labs, and


                                                 skin integrity.


                                                 5. F/U as moderate risk in 3-5


                                                 days, 8/15-

## 2019-08-22 NOTE — PROGRESS NOTES
DATE:  08/22/2019



SUBJECTIVE:  The patient confused, rambling, poorly oriented, still yelling at

me get away, get away, very paranoid.  Seems to be doing better, calmer, is

mostly withdrawn, keeps to self.  Seems to be to some extent approaching his

baseline.  He is coming from [facility].



ASSESSMENT:  The patient likely approaching baseline, generally calmer, more

cooperative.  We will continue to monitor.





DD: 08/22/2019 15:27

DT: 08/22/2019 19:42

[facility]# [id number]  [id number]

## 2019-08-23 RX ADMIN — HALOPERIDOL LACTATE SCH MG: 2 SOLUTION, CONCENTRATE ORAL at 13:24

## 2019-08-23 RX ADMIN — HALOPERIDOL LACTATE SCH MG: 2 SOLUTION, CONCENTRATE ORAL at 20:48

## 2019-08-23 RX ADMIN — HALOPERIDOL LACTATE SCH MG: 2 SOLUTION, CONCENTRATE ORAL at 08:53

## 2019-08-23 NOTE — INTERNAL MEDICINE PROG NOTE
Internal Medicine Subjective





- Subjective


Patient seen and examined:: with staff, chart reviewed


Patient is:: awake, verbal, interactive, agitated, confused


Per staff patient has:: no adverse event, no episodes of fall, poor appetite, 

confused, tolerating meds





Internal Medicine Objective





- Results


Result Diagrams: 


 19 12:35





 19 12:35


Recent Labs: 


 Laboratory Last Values











WBC  10.3 Th/cmm (4.8-10.8)   19  12:35    


 


RBC  4.00 Mil/cmm (4.30-5.70)  L  19  12:35    


 


Hgb  11.6 gm/dL (12-16)  L  19  12:35    


 


Hct  35.0 % (41.0-60)  L  19  12:35    


 


MCV  87.6 fl (80-99)   19  12:35    


 


MCH  28.9 pg (26.0-30.0)   19  12:35    


 


MCHC Differential  33.0 pg (28.0-36.0)   19  12:35    


 


RDW  15.7 % (11.5-20.0)   19  12:35    


 


Plt Count  335 Th/cmm (150-400)   19  12:35    


 


MPV  8.2 fl  19  12:35    


 


Neutrophils %  63.9 % (40.0-80.0)   19  12:35    


 


Lymphocytes %  21.7 % (20.0-50.0)   19  12:35    


 


Monocytes %  7.4 % (2.0-10.0)   19  12:35    


 


Eosinophils %  5.9 % (0.0-5.0)  H  19  12:35    


 


Basophils %  1.1 % (0.0-2.0)   19  12:35    


 


Sodium  132 mEq/L (136-145)  L  19  12:35    


 


Potassium  4.5 mEq/L (3.5-5.1)   19  12:35    


 


Chloride  102 mEq/L ()   19  12:35    


 


Carbon Dioxide  26.4 mEq/L (21.0-31.0)   19  12:35    


 


Anion Gap  8.1  (7.0-16.0)   19  12:35    


 


BUN  27 mg/dL (7-25)  H  19  12:35    


 


Creatinine  1.1 mg/dL (0.7-1.3)   19  12:35    


 


Est GFR ( Amer)  > 60.0 ml/min (>90)   19  12:35    


 


Est GFR (Non-Af Amer)  > 60.0 ml/min  19  12:35    


 


BUN/Creatinine Ratio  24.5   19  12:35    


 


Glucose  96 mg/dL ()   19  12:35    


 


Calcium  9.7 mg/dL (8.6-10.3)   19  12:35    


 


Total Bilirubin  0.4 mg/dL (0.3-1.0)   19  12:35    


 


AST  16 U/L (13-39)   19  12:35    


 


ALT  13 U/L (7-52)   19  12:35    


 


Alkaline Phosphatase  38 U/L ()   19  12:35    


 


Total Protein  6.4 gm/dL (6.0-8.3)   19  12:35    


 


Albumin  3.6 gm/dL (4.2-5.5)  L  19  12:35    


 


Globulin  2.8 gm/dL  19  12:35    


 


Albumin/Globulin Ratio  1.3  (1.0-1.8)   19  12:35    


 


Triglycerides  86 mg/dL (<150)   19  12:35    


 


Cholesterol  144 mg/dL (<200)   19  12:35    


 


LDL Cholesterol Direct  81 mg/dL ()   19  12:35    


 


HDL Cholesterol  50 mg/dL (23-92)   19  12:35    


 


Lithium  1.18 mmol/L (0.5-1.0)  H  19  12:35    














- Physical Exam


Vitals and I&O: 


 Vital Signs











Temp  97.6 F   19 15:28


 


Pulse  91   19 15:28


 


Resp  20   19 15:28


 


BP  105/75   19 15:28


 


Pulse Ox  97   19 15:28








 Intake & Output











 19





 18:59 06:59 18:59


 


Intake Total 1000  


 


Balance 1000  


 


Weight (lbs) 54.431 kg  


 


Intake:   


 


  Oral 1000  


 


Other:   


 


  # Voids 4  


 


  # Bowel Movements 1  


 


  Weight Source Bedscale  











Active Medications: 


Current Medications





Acetaminophen (Tylenol)  650 mg PO Q6H PRN


   PRN Reason: Pain / Temp above 100


   Stop: 10/11/19 14:24


Al Hydrox/Mg Hydrox/Simethicone (Maalox)  30 ml PO Q4H PRN


   PRN Reason: GI DISTRESS


   Stop: 10/11/19 14:24


Ascorbic Acid (Vitamin C)  500 mg PO DAILY [facility]


   Stop: 10/12/19 08:59


   Last Admin: 19 08:55 Dose:  500 mg


Clonidine HCl (Catapres-Tts-1)  1 patch TD QTUE [facility]


   Stop: 10/12/19 08:59


   Last Admin: 19 09:57 Dose:  Not Given


Docusate Sodium (Colace)  100 mg PO DAILY [facility]


   Stop: 10/12/19 08:59


   Last Admin: 19 08:55 Dose:  100 mg


Ferrous Sulfate (Iron)  325 mg PO DAILY [facility]


   Stop: 10/12/19 08:59


   Last Admin: 19 08:55 Dose:  325 mg


Haloperidol Lactate (Haldol)  3 mg PO TID [facility]


   Stop: 10/13/19 20:59


   Last Admin: 19 08:53 Dose:  3 mg


Lorazepam (Ativan)  0.5 mg PO Q6H PRN; Protocol


   PRN Reason: Agitation


   Stop: 10/11/19 14:24


   Last Admin: 19 23:08 Dose:  0.5 mg


Magnesium Hydroxide (Milk Of Magnesia)  30 ml PO HS PRN


   PRN Reason: Constipation


   Stop: 10/11/19 14:24


Multivitamins/Vitamin C (Theragran)  1 tab PO DAILY [facility]


   Stop: 10/12/19 08:59


   Last Admin: 19 08:56 Dose:  1 tab


Trazodone HCl (Desyrel)  50 mg PO HS [facility]; Protocol


   Stop: 10/11/19 20:59


   Last Admin: 19 20:22 Dose:  50 mg








General: demented, thin, appears older


HEENT: NC/AT, PERRLA, EOMI


Neck: Supple, No JVD, No thyromegaly, No LAD


Lungs: CTAB


Cardiovascular: RRR, Normal S1, Normal S2


Abdomen: soft, non-tender, thin, non-distended, positive bowel sound


Extremities: excoriation, contracture





Internal Medicine Assmt/Plan





- Assessment


Assessment: 





ASSESSMENT AND PLAN:


1.  Hypertension.


2.  Dehydration.


3.  Renal insufficiency.


4.  Thin built.


5.  History of agitation and psych disorder.


6.  Anemia.


7.  Hyponatremia.


8.  Low albumin.











- Plan


Plan: 








PLAN:  Continue oxygen, bronchodilator treatments.  We will provide the patient


with adequate support with antihypertensive medication.  We will monitor


hemoglobin and hematocrit.  We will review meds  We will continue


monitoring the patient closely.


will refer to PT for eval





Nutritional Asmnt/Malnutr-PDOC





- Dietary Evaluation


Malnutrition Findings (Please click <Entered> for more info): 








Nutritional Asmnt/Malnutrition                             Start:  19 11:

29


Text:                                                      Status: Complete    

  


Freq:                                                                          

  


Protocol:                                                                      

  


 Document     19 11:29  JUSTINE  (Rec: 19 11:33  JUSTINE [email or web address])


 Nutritional Asmnt/Malnutrition


     Patient General Information


      Nutritional Screening                      High Risk


      Diagnosis                                  Psychosis


      Pertinent Medical Hx/Surgical Hx           Psych Disorder, oral ulcer


                                                 disease, COPD


      Subjective Information                     Pt is a 59-year-old male


                                                 admitted on  d/t


                                                 aggressiveness. RN, Vincenzo,


                                                 stated Pt ate well this


                                                 morning, 70% meal and seemed


                                                 to be in a good mood. As Pt is


                                                 underweight and has refused


                                                 meals d/t psychosis in


                                                 previous hospital visits,


                                                 adding Ensure Enlive TID to


                                                 encourage weight gain trending


                                                 towards IBW.


                                                 HT: 510


                                                 WT: 110 LB (50 kg)


                                                 BMI: 15.79 (Underweight)


                                                 GI: Not noted


                                                 BM: Not Noted


                                                 I/O: Not Noted


                                                 Skin: Not noted


                                                 Clifford: 17


                                                 Diet Order: Cardiac, chopped


                                                 Estimated Energy Needs: (


                                                 Underweight, CBW)


                                                 4718-1436 kcals (30-35 kcals/


                                                 kg)


                                                 50-60g Pro (1.0-1.2 g/kg)


                                                 2883-2915 ml (35-40 ml/kg)


      Current Diet Order/ Nutrition Support      Cardiac, chopped


      Pertinent Medications                      Maalox (PRN), Vitamin C,


                                                 Colace, Ferrous Sulfate, MOM (


                                                 PRN), Theregran


      Pertinent Labs                             : Hgb/Hct 11.6/35.0, Na


                                                 132, BUN/Cr 27/1.1, Alb 3.6


     Nutritional Hx/Data


      Height                                     1.78 m


      Height (Calculated Centimeters)            177.8


      Current Weight (lbs)                       49.895 kg


      Weight (Calculated Kilograms)              49.9


      Weight (Calculated Grams)                  89229.2


      Ideal Body Weight                          166 LB (75.45kg)


      % Ideal Body Weight                        66


      Body Mass Index (BMI)                      15.7


      Weight Status                              Underweight


     GI Symptoms


      Last BM                                    Not noted


      Skin Integrity/Comment:                    Clifford: 17


      Current %PO                                Fair (50-74%)


     Estimated Nutritional Goals


      BEE in Kcals:                              Using Current wt


      Calories/Kcals/Kg                          30-35


      Kcals Calculated                           8937-3898


      Protein:                                   Using Current wt


      Protein g/k.0-1.2


      Protein Calculated                         50-60


      Fluid: ml                                  6650-0702 ml (35-40 ml/kg)


     Nutritional Problem


      1. Problem


       Problem                                   Underweight


       Etiology                                  r/t psychosis


       Signs/Symptoms:                           aeb Pt past behavior of


                                                 refusing meals for consecutive


                                                 days.


     Malnutrition Related to Morbid Obesity


      Malnutrition related to morbid obesity     No


     Intervention/Recommendation


      Comments                                   1. Continue with Cardiac,


                                                 chopped diet as ordered.


                                                 2. Add Ensure Enlive TID (


                                                 completed).


     Expected Outcomes/Goals


      Expected Outcomes/Goals                    1. Continue with Cardiac,


                                                 chopped diet as ordered.


                                                 2. Add Ensure Enlive TID (


                                                 completed).


                                                 3. PO intake to meet 75% of


                                                 nutritional needs.


                                                 4. Monitor PO intake, wt,


                                                 nutrition related labs, and


                                                 skin integrity.


                                                 5. F/U as moderate risk in 3-5


                                                 days, 8/15-

## 2019-08-23 NOTE — PROGRESS NOTES
DATE:  08/23/2019



SUBJECTIVE:  The patient in the hospital, still impulsive, yelling at me and

cursing, "no, no, no," very paranoid, delusional, thinks we are trying to harm

him.  He will just basically say "hello, how are you?"  Mostly keeps to self,

withdrawn, ongoing symptoms, concerns for psychotic symptoms, acting out

behaviors, impulsive, unpredictable, but generally calmer.  Staff noting

improvement, taking his medications.  Medications were noted.  Vitals were

reviewed.



PLAN:  We will continue to monitor, titrate and adjust medications, doing better

with the higher dose of Haldol.





DD: 08/23/2019 16:54

DT: 08/23/2019 22:46

JOB# [id number]  [id number]

## 2019-08-24 RX ADMIN — HALOPERIDOL LACTATE SCH MG: 2 SOLUTION, CONCENTRATE ORAL at 21:07

## 2019-08-24 RX ADMIN — HALOPERIDOL LACTATE SCH MG: 2 SOLUTION, CONCENTRATE ORAL at 08:48

## 2019-08-24 RX ADMIN — HALOPERIDOL LACTATE SCH MG: 2 SOLUTION, CONCENTRATE ORAL at 14:01

## 2019-08-24 NOTE — INTERNAL MEDICINE PROG NOTE
Internal Medicine Subjective





- Subjective


Patient seen and examined:: with staff, chart reviewed


Patient is:: awake, verbal, interactive, agitated, confused


Per staff patient has:: no adverse event, no episodes of fall, poor appetite, 

confused, tolerating meds





Internal Medicine Objective





- Results


Result Diagrams: 


 19 12:35





 19 12:35


Recent Labs: 


 Laboratory Last Values











WBC  10.3 Th/cmm (4.8-10.8)   19  12:35    


 


RBC  4.00 Mil/cmm (4.30-5.70)  L  19  12:35    


 


Hgb  11.6 gm/dL (12-16)  L  19  12:35    


 


Hct  35.0 % (41.0-60)  L  19  12:35    


 


MCV  87.6 fl (80-99)   19  12:35    


 


MCH  28.9 pg (26.0-30.0)   19  12:35    


 


MCHC Differential  33.0 pg (28.0-36.0)   19  12:35    


 


RDW  15.7 % (11.5-20.0)   19  12:35    


 


Plt Count  335 Th/cmm (150-400)   19  12:35    


 


MPV  8.2 fl  19  12:35    


 


Neutrophils %  63.9 % (40.0-80.0)   19  12:35    


 


Lymphocytes %  21.7 % (20.0-50.0)   19  12:35    


 


Monocytes %  7.4 % (2.0-10.0)   19  12:35    


 


Eosinophils %  5.9 % (0.0-5.0)  H  19  12:35    


 


Basophils %  1.1 % (0.0-2.0)   19  12:35    


 


Sodium  132 mEq/L (136-145)  L  19  12:35    


 


Potassium  4.5 mEq/L (3.5-5.1)   19  12:35    


 


Chloride  102 mEq/L ()   19  12:35    


 


Carbon Dioxide  26.4 mEq/L (21.0-31.0)   19  12:35    


 


Anion Gap  8.1  (7.0-16.0)   19  12:35    


 


BUN  27 mg/dL (7-25)  H  19  12:35    


 


Creatinine  1.1 mg/dL (0.7-1.3)   19  12:35    


 


Est GFR ( Amer)  > 60.0 ml/min (>90)   19  12:35    


 


Est GFR (Non-Af Amer)  > 60.0 ml/min  19  12:35    


 


BUN/Creatinine Ratio  24.5   19  12:35    


 


Glucose  96 mg/dL ()   19  12:35    


 


Calcium  9.7 mg/dL (8.6-10.3)   19  12:35    


 


Total Bilirubin  0.4 mg/dL (0.3-1.0)   19  12:35    


 


AST  16 U/L (13-39)   19  12:35    


 


ALT  13 U/L (7-52)   19  12:35    


 


Alkaline Phosphatase  38 U/L ()   19  12:35    


 


Total Protein  6.4 gm/dL (6.0-8.3)   19  12:35    


 


Albumin  3.6 gm/dL (4.2-5.5)  L  19  12:35    


 


Globulin  2.8 gm/dL  19  12:35    


 


Albumin/Globulin Ratio  1.3  (1.0-1.8)   19  12:35    


 


Triglycerides  86 mg/dL (<150)   19  12:35    


 


Cholesterol  144 mg/dL (<200)   19  12:35    


 


LDL Cholesterol Direct  81 mg/dL ()   19  12:35    


 


HDL Cholesterol  50 mg/dL (23-92)   19  12:35    


 


Lithium  1.18 mmol/L (0.5-1.0)  H  19  12:35    














- Physical Exam


Vitals and I&O: 


 Vital Signs











Temp  97.8 F   19 20:00


 


Pulse  89   19 20:00


 


Resp  20   19 20:00


 


BP  116/73   19 20:00


 


Pulse Ox  99   19 20:00








 Intake & Output











 19





 18:59 06:59 18:59


 


Intake Total 1000  


 


Balance 1000  


 


Weight (lbs) 56.155 kg  


 


Intake:   


 


  Oral 1000  


 


Other:   


 


  # Voids 4  


 


  # Bowel Movements 1  


 


  Weight Source Bedscale  











Active Medications: 


Current Medications





Acetaminophen (Tylenol)  650 mg PO Q6H PRN


   PRN Reason: Pain / Temp above 100


   Stop: 10/11/19 14:24


Al Hydrox/Mg Hydrox/Simethicone (Maalox)  30 ml PO Q4H PRN


   PRN Reason: GI DISTRESS


   Stop: 10/11/19 14:24


Ascorbic Acid (Vitamin C)  500 mg PO DAILY [facility]


   Stop: 10/12/19 08:59


   Last Admin: 19 08:48 Dose:  500 mg


Clonidine HCl (Catapres-Tts-1)  1 patch TD QTUE [facility]


   Stop: 10/12/19 08:59


   Last Admin: 19 09:57 Dose:  Not Given


Docusate Sodium (Colace)  100 mg PO DAILY [facility]


   Stop: 10/12/19 08:59


   Last Admin: 19 08:48 Dose:  100 mg


Ferrous Sulfate (Iron)  325 mg PO DAILY [facility]


   Stop: 10/12/19 08:59


   Last Admin: 19 08:47 Dose:  325 mg


Haloperidol Lactate (Haldol)  3 mg PO TID [facility]


   Stop: 10/13/19 20:59


   Last Admin: 19 08:48 Dose:  3 mg


Lorazepam (Ativan)  0.5 mg PO Q6H PRN; Protocol


   PRN Reason: Agitation


   Stop: 10/11/19 14:24


   Last Admin: 19 23:08 Dose:  0.5 mg


Magnesium Hydroxide (Milk Of Magnesia)  30 ml PO HS PRN


   PRN Reason: Constipation


   Stop: 10/11/19 14:24


Multivitamins/Vitamin C (Theragran)  1 tab PO DAILY [facility]


   Stop: 10/12/19 08:59


   Last Admin: 19 08:48 Dose:  1 tab


Trazodone HCl (Desyrel)  50 mg PO HS [facility]; Protocol


   Stop: 10/11/19 20:59


   Last Admin: 19 20:48 Dose:  50 mg








General: demented, thin, appears older


HEENT: NC/AT, PERRLA, EOMI


Neck: Supple, No JVD, No thyromegaly, No LAD


Lungs: CTAB


Cardiovascular: RRR, Normal S1, Normal S2


Abdomen: soft, non-tender, thin, non-distended, positive bowel sound


Extremities: excoriation, contracture





Internal Medicine Assmt/Plan





- Assessment


Assessment: 





ASSESSMENT AND PLAN:


1.  Hypertension.


2.  Dehydration.


3.  Renal insufficiency.


4.  Thin built.


5.  History of agitation and psych disorder.


6.  Anemia.


7.  Hyponatremia.


8.  Low albumin.











- Plan


Plan: 








PLAN:  Continue oxygen, bronchodilator treatments.  We will provide the patient


with adequate support with antihypertensive medication.  We will monitor


hemoglobin and hematocrit.  We will review meds  We will continue


monitoring the patient closely.


will refer to PT for eval





Nutritional Asmnt/Malnutr-PDOC





- Dietary Evaluation


Malnutrition Findings (Please click <Entered> for more info): 








Nutritional Asmnt/Malnutrition                             Start:  19 11:

29


Text:                                                      Status: Complete    

  


Freq:                                                                          

  


Protocol:                                                                      

  


 Document     19 11:29  JUSTINE  (Rec: 19 11:33  JUSTINE [email or web address])


 Nutritional Asmnt/Malnutrition


     Patient General Information


      Nutritional Screening                      High Risk


      Diagnosis                                  Psychosis


      Pertinent Medical Hx/Surgical Hx           Psych Disorder, oral ulcer


                                                 disease, COPD


      Subjective Information                     Pt is a 59-year-old male


                                                 admitted on  d/t


                                                 aggressiveness. RN, Vincenzo,


                                                 stated Pt ate well this


                                                 morning, 70% meal and seemed


                                                 to be in a good mood. As Pt is


                                                 underweight and has refused


                                                 meals d/t psychosis in


                                                 previous hospital visits,


                                                 adding Ensure Enlive TID to


                                                 encourage weight gain trending


                                                 towards IBW.


                                                 HT: 510


                                                 WT: 110 LB (50 kg)


                                                 BMI: 15.79 (Underweight)


                                                 GI: Not noted


                                                 BM: Not Noted


                                                 I/O: Not Noted


                                                 Skin: Not noted


                                                 Clifford: 17


                                                 Diet Order: Cardiac, chopped


                                                 Estimated Energy Needs: (


                                                 Underweight, CBW)


                                                 7059-5282 kcals (30-35 kcals/


                                                 kg)


                                                 50-60g Pro (1.0-1.2 g/kg)


                                                 0529-4086 ml (35-40 ml/kg)


      Current Diet Order/ Nutrition Support      Cardiac, chopped


      Pertinent Medications                      Maalox (PRN), Vitamin C,


                                                 Colace, Ferrous Sulfate, MOM (


                                                 PRN), Theregran


      Pertinent Labs                             : Hgb/Hct 11.6/35.0, Na


                                                 132, BUN/Cr 27/1.1, Alb 3.6


     Nutritional Hx/Data


      Height                                     1.78 m


      Height (Calculated Centimeters)            177.8


      Current Weight (lbs)                       49.895 kg


      Weight (Calculated Kilograms)              49.9


      Weight (Calculated Grams)                  23768.2


      Ideal Body Weight                          166 LB (75.45kg)


      % Ideal Body Weight                        66


      Body Mass Index (BMI)                      15.7


      Weight Status                              Underweight


     GI Symptoms


      Last BM                                    Not noted


      Skin Integrity/Comment:                    Clifford: 17


      Current %PO                                Fair (50-74%)


     Estimated Nutritional Goals


      BEE in Kcals:                              Using Current wt


      Calories/Kcals/Kg                          30-35


      Kcals Calculated                           2380-3562


      Protein:                                   Using Current wt


      Protein g/k.0-1.2


      Protein Calculated                         50-60


      Fluid: ml                                  7798-4717 ml (35-40 ml/kg)


     Nutritional Problem


      1. Problem


       Problem                                   Underweight


       Etiology                                  r/t psychosis


       Signs/Symptoms:                           aeb Pt past behavior of


                                                 refusing meals for consecutive


                                                 days.


     Malnutrition Related to Morbid Obesity


      Malnutrition related to morbid obesity     No


     Intervention/Recommendation


      Comments                                   1. Continue with Cardiac,


                                                 chopped diet as ordered.


                                                 2. Add Ensure Enlive TID (


                                                 completed).


     Expected Outcomes/Goals


      Expected Outcomes/Goals                    1. Continue with Cardiac,


                                                 chopped diet as ordered.


                                                 2. Add Ensure Enlive TID (


                                                 completed).


                                                 3. PO intake to meet 75% of


                                                 nutritional needs.


                                                 4. Monitor PO intake, wt,


                                                 nutrition related labs, and


                                                 skin integrity.


                                                 5. F/U as moderate risk in 3-5


                                                 days, 8/15-

## 2019-08-24 NOTE — PROGRESS NOTES
DATE:  08/24/2019



Covering for Dr. Che.



IDENTIFYING DATA:  A 59-year-old male brought in here from the ER, who presented

irritable, agitated with a history of schizophrenia and paranoia.



Nursing staff reported the patient continues to be isolative, withdrawn,

intermittently yelling.  As early as yesterday, Dr. Che documented that

the patient continues to yell "no, no, no," extremely paranoid.  They were

trying to harm him.  Today, on face-to-face evaluation, mostly disengaged with a

blunted and flat affect, poor historian, does not give much information besides

hello, how are you?  Due to the patient's disorganized state and poor historian

and limited verbal, unable to get more information from him.



MENTAL STATUS EXAMINATION:  Isolated, withdrawn, disengaged.



Reconciliation medication reviewed, haloperidol 3 mg p.o. t.i.d., trazodone 50

mg at nighttime as needed.



No side effects.



ASSESSMENT AND PLAN:  History of schizoaffective disorder, tolerating the

current medication regimen without complications.  We will continue monitoring

and evaluating.





DD: 08/24/2019 10:54

DT: 08/24/2019 19:31

JOB# [id number]  [id number]

## 2019-08-25 RX ADMIN — HALOPERIDOL LACTATE SCH MG: 2 SOLUTION, CONCENTRATE ORAL at 14:00

## 2019-08-25 RX ADMIN — HALOPERIDOL LACTATE SCH MG: 2 SOLUTION, CONCENTRATE ORAL at 21:53

## 2019-08-25 RX ADMIN — HALOPERIDOL LACTATE SCH MG: 2 SOLUTION, CONCENTRATE ORAL at 08:38

## 2019-08-25 NOTE — INTERNAL MEDICINE PROG NOTE
Internal Medicine Subjective





- Subjective


Patient seen and examined:: with staff, chart reviewed


Patient is:: awake, verbal, interactive, agitated, confused


Per staff patient has:: no adverse event, no episodes of fall, poor appetite, 

confused, tolerating meds





Internal Medicine Objective





- Results


Result Diagrams: 


 19 12:35





 19 12:35


Recent Labs: 


 Laboratory Last Values











WBC  10.3 Th/cmm (4.8-10.8)   19  12:35    


 


RBC  4.00 Mil/cmm (4.30-5.70)  L  19  12:35    


 


Hgb  11.6 gm/dL (12-16)  L  19  12:35    


 


Hct  35.0 % (41.0-60)  L  19  12:35    


 


MCV  87.6 fl (80-99)   19  12:35    


 


MCH  28.9 pg (26.0-30.0)   19  12:35    


 


MCHC Differential  33.0 pg (28.0-36.0)   19  12:35    


 


RDW  15.7 % (11.5-20.0)   19  12:35    


 


Plt Count  335 Th/cmm (150-400)   19  12:35    


 


MPV  8.2 fl  19  12:35    


 


Neutrophils %  63.9 % (40.0-80.0)   19  12:35    


 


Lymphocytes %  21.7 % (20.0-50.0)   19  12:35    


 


Monocytes %  7.4 % (2.0-10.0)   19  12:35    


 


Eosinophils %  5.9 % (0.0-5.0)  H  19  12:35    


 


Basophils %  1.1 % (0.0-2.0)   19  12:35    


 


Sodium  132 mEq/L (136-145)  L  19  12:35    


 


Potassium  4.5 mEq/L (3.5-5.1)   19  12:35    


 


Chloride  102 mEq/L ()   19  12:35    


 


Carbon Dioxide  26.4 mEq/L (21.0-31.0)   19  12:35    


 


Anion Gap  8.1  (7.0-16.0)   19  12:35    


 


BUN  27 mg/dL (7-25)  H  19  12:35    


 


Creatinine  1.1 mg/dL (0.7-1.3)   19  12:35    


 


Est GFR ( Amer)  > 60.0 ml/min (>90)   19  12:35    


 


Est GFR (Non-Af Amer)  > 60.0 ml/min  19  12:35    


 


BUN/Creatinine Ratio  24.5   19  12:35    


 


Glucose  96 mg/dL ()   19  12:35    


 


Calcium  9.7 mg/dL (8.6-10.3)   19  12:35    


 


Total Bilirubin  0.4 mg/dL (0.3-1.0)   19  12:35    


 


AST  16 U/L (13-39)   19  12:35    


 


ALT  13 U/L (7-52)   19  12:35    


 


Alkaline Phosphatase  38 U/L ()   19  12:35    


 


Total Protein  6.4 gm/dL (6.0-8.3)   19  12:35    


 


Albumin  3.6 gm/dL (4.2-5.5)  L  19  12:35    


 


Globulin  2.8 gm/dL  19  12:35    


 


Albumin/Globulin Ratio  1.3  (1.0-1.8)   19  12:35    


 


Triglycerides  86 mg/dL (<150)   19  12:35    


 


Cholesterol  144 mg/dL (<200)   19  12:35    


 


LDL Cholesterol Direct  81 mg/dL ()   19  12:35    


 


HDL Cholesterol  50 mg/dL (23-92)   19  12:35    


 


Lithium  1.18 mmol/L (0.5-1.0)  H  19  12:35    














- Physical Exam


Vitals and I&O: 


 Vital Signs











Temp  98 F   19 06:46


 


Pulse  78   19 06:46


 


Resp  20   19 06:46


 


BP  111/60   19 06:46


 


Pulse Ox  98   19 06:46








 Intake & Output











 19





 18:59 06:59 18:59


 


Intake Total 1400 120 


 


Balance 1400 120 


 


Weight (lbs) 56.245 kg 56.245 kg 


 


Intake:   


 


  Oral 1400 120 


 


Other:   


 


  # Voids 4 3 


 


  # Bowel Movements 0  


 


  Weight Source Estimated Estimated 











Active Medications: 


Current Medications





Acetaminophen (Tylenol)  650 mg PO Q6H PRN


   PRN Reason: Pain / Temp above 100


   Stop: 10/11/19 14:24


Al Hydrox/Mg Hydrox/Simethicone (Maalox)  30 ml PO Q4H PRN


   PRN Reason: GI DISTRESS


   Stop: 10/11/19 14:24


Ascorbic Acid (Vitamin C)  500 mg PO DAILY [facility]


   Stop: 10/12/19 08:59


   Last Admin: 19 08:39 Dose:  500 mg


Clonidine HCl (Catapres-Tts-1)  1 patch TD QTUE [facility]


   Stop: 10/12/19 08:59


   Last Admin: 19 09:57 Dose:  Not Given


Docusate Sodium (Colace)  100 mg PO DAILY [facility]


   Stop: 10/12/19 08:59


   Last Admin: 19 08:39 Dose:  100 mg


Ferrous Sulfate (Iron)  325 mg PO DAILY [facility]


   Stop: 10/12/19 08:59


   Last Admin: 19 08:38 Dose:  325 mg


Haloperidol Lactate (Haldol)  3 mg PO TID [facility]


   Stop: 10/13/19 20:59


   Last Admin: 19 08:38 Dose:  3 mg


Lorazepam (Ativan)  0.5 mg PO Q6H PRN; Protocol


   PRN Reason: Agitation


   Stop: 10/11/19 14:24


   Last Admin: 19 23:08 Dose:  0.5 mg


Magnesium Hydroxide (Milk Of Magnesia)  30 ml PO HS PRN


   PRN Reason: Constipation


   Stop: 10/11/19 14:24


Multivitamins/Vitamin C (Theragran)  1 tab PO DAILY [facility]


   Stop: 10/12/19 08:59


   Last Admin: 19 08:39 Dose:  1 tab


Trazodone HCl (Desyrel)  50 mg PO HS [facility]; Protocol


   Stop: 10/11/19 20:59


   Last Admin: 19 21:07 Dose:  50 mg








General: demented, thin, appears older


HEENT: NC/AT, PERRLA, EOMI


Neck: Supple, No JVD, No thyromegaly, No LAD


Lungs: CTAB


Cardiovascular: RRR, Normal S1, Normal S2


Abdomen: soft, non-tender, thin, non-distended, positive bowel sound


Extremities: excoriation, contracture





Internal Medicine Assmt/Plan





- Assessment


Assessment: 





ASSESSMENT AND PLAN:


1.  Hypertension.


2.  Dehydration.


3.  Renal insufficiency.


4.  Thin built.


5.  History of agitation and psych disorder.


6.  Anemia.


7.  Hyponatremia.


8.  Low albumin.











- Plan


Plan: 








PLAN:  Continue oxygen, bronchodilator treatments.  We will provide the patient


with adequate support with antihypertensive medication.  We will monitor


hemoglobin and hematocrit.  We will review meds  We will continue


monitoring the patient closely.


will refer to PT for eval





Nutritional Asmnt/Malnutr-PDOC





- Dietary Evaluation


Malnutrition Findings (Please click <Entered> for more info): 








Nutritional Asmnt/Malnutrition                             Start:  19 11:

29


Text:                                                      Status: Complete    

  


Freq:                                                                          

  


Protocol:                                                                      

  


 Document     19 11:29  JUSTINE  (Rec: 19 11:33  JUSTINE  [id number])


 Nutritional Asmnt/Malnutrition


     Patient General Information


      Nutritional Screening                      High Risk


      Diagnosis                                  Psychosis


      Pertinent Medical Hx/Surgical Hx           Psych Disorder, oral ulcer


                                                 disease, COPD


      Subjective Information                     Pt is a 59-year-old male


                                                 admitted on  d/t


                                                 aggressiveness. RN, Vincenzo,


                                                 stated Pt ate well this


                                                 morning, 70% meal and seemed


                                                 to be in a good mood. As Pt is


                                                 underweight and has refused


                                                 meals d/t psychosis in


                                                 previous hospital visits,


                                                 adding Ensure Enlive TID to


                                                 encourage weight gain trending


                                                 towards IBW.


                                                 HT: 510


                                                 WT: 110 LB (50 kg)


                                                 BMI: 15.79 (Underweight)


                                                 GI: Not noted


                                                 BM: Not Noted


                                                 I/O: Not Noted


                                                 Skin: Not noted


                                                 Clifford: 17


                                                 Diet Order: Cardiac, chopped


                                                 Estimated Energy Needs: (


                                                 Underweight, CBW)


                                                 2829-8665 kcals (30-35 kcals/


                                                 kg)


                                                 50-60g Pro (1.0-1.2 g/kg)


                                                 2007-7819 ml (35-40 ml/kg)


      Current Diet Order/ Nutrition Support      Cardiac, chopped


      Pertinent Medications                      Maalox (PRN), Vitamin C,


                                                 Colace, Ferrous Sulfate, MOM (


                                                 PRN), Theregran


      Pertinent Labs                             : Hgb/Hct 11.6/35.0, Na


                                                 132, BUN/Cr 27/1.1, Alb 3.6


     Nutritional Hx/Data


      Height                                     1.78 m


      Height (Calculated Centimeters)            177.8


      Current Weight (lbs)                       49.895 kg


      Weight (Calculated Kilograms)              49.9


      Weight (Calculated Grams)                  01122.2


      Ideal Body Weight                          166 LB (75.45kg)


      % Ideal Body Weight                        66


      Body Mass Index (BMI)                      15.7


      Weight Status                              Underweight


     GI Symptoms


      Last BM                                    Not noted


      Skin Integrity/Comment:                    Clifford: 17


      Current %PO                                Fair (50-74%)


     Estimated Nutritional Goals


      BEE in Kcals:                              Using Current wt


      Calories/Kcals/Kg                          30-35


      Kcals Calculated                           2659-0244


      Protein:                                   Using Current wt


      Protein g/k.0-1.2


      Protein Calculated                         50-60


      Fluid: ml                                  0038-7939 ml (35-40 ml/kg)


     Nutritional Problem


      1. Problem


       Problem                                   Underweight


       Etiology                                  r/t psychosis


       Signs/Symptoms:                           aeb Pt past behavior of


                                                 refusing meals for consecutive


                                                 days.


     Malnutrition Related to Morbid Obesity


      Malnutrition related to morbid obesity     No


     Intervention/Recommendation


      Comments                                   1. Continue with Cardiac,


                                                 chopped diet as ordered.


                                                 2. Add Ensure Enlive TID (


                                                 completed).


     Expected Outcomes/Goals


      Expected Outcomes/Goals                    1. Continue with Cardiac,


                                                 chopped diet as ordered.


                                                 2. Add Ensure Enlive TID (


                                                 completed).


                                                 3. PO intake to meet 75% of


                                                 nutritional needs.


                                                 4. Monitor PO intake, wt,


                                                 nutrition related labs, and


                                                 skin integrity.


                                                 5. F/U as moderate risk in 3-5


                                                 days, 8/15-

## 2019-08-25 NOTE — PROGRESS NOTES
DATE:  08/25/2019



Covering for Dr. Che.



Today on face-to-face evaluation, upon approach, his first comments are "I

don't want to get involved" and redirect multiple times and even attempted to

validate his emotions, he becomes easily anxious, just reporting that he does

not want to get involved and backs away from the interview.



MENTAL STATUS EXAMINATION:  Perseverate, disengaged, suspicious.



ASSESSMENT AND PLAN:  History of schizoaffective disorder, tolerating the

current medication regimens as he continues to ruminate and perseverate.





DD: 08/25/2019 06:51

DT: 08/25/2019 20:19

JOB# [id number]  [id number]

## 2019-08-26 RX ADMIN — HALOPERIDOL LACTATE SCH MG: 2 SOLUTION, CONCENTRATE ORAL at 08:27

## 2019-08-26 RX ADMIN — HALOPERIDOL LACTATE SCH: 2 SOLUTION, CONCENTRATE ORAL at 13:34

## 2019-08-26 NOTE — INTERNAL MEDICINE PROG NOTE
Internal Medicine Subjective





- Subjective


Patient seen and examined:: with staff, chart reviewed


Patient is:: awake, verbal, interactive, agitated, confused


Per staff patient has:: no adverse event, no episodes of fall, poor appetite, 

confused, tolerating meds





Internal Medicine Objective





- Results


Result Diagrams: 


 19 12:35





 19 12:35


Recent Labs: 


 Laboratory Last Values











WBC  10.3 Th/cmm (4.8-10.8)   19  12:35    


 


RBC  4.00 Mil/cmm (4.30-5.70)  L  19  12:35    


 


Hgb  11.6 gm/dL (12-16)  L  19  12:35    


 


Hct  35.0 % (41.0-60)  L  19  12:35    


 


MCV  87.6 fl (80-99)   19  12:35    


 


MCH  28.9 pg (26.0-30.0)   19  12:35    


 


MCHC Differential  33.0 pg (28.0-36.0)   19  12:35    


 


RDW  15.7 % (11.5-20.0)   19  12:35    


 


Plt Count  335 Th/cmm (150-400)   19  12:35    


 


MPV  8.2 fl  19  12:35    


 


Neutrophils %  63.9 % (40.0-80.0)   19  12:35    


 


Lymphocytes %  21.7 % (20.0-50.0)   19  12:35    


 


Monocytes %  7.4 % (2.0-10.0)   19  12:35    


 


Eosinophils %  5.9 % (0.0-5.0)  H  19  12:35    


 


Basophils %  1.1 % (0.0-2.0)   19  12:35    


 


Sodium  132 mEq/L (136-145)  L  19  12:35    


 


Potassium  4.5 mEq/L (3.5-5.1)   19  12:35    


 


Chloride  102 mEq/L ()   19  12:35    


 


Carbon Dioxide  26.4 mEq/L (21.0-31.0)   19  12:35    


 


Anion Gap  8.1  (7.0-16.0)   19  12:35    


 


BUN  27 mg/dL (7-25)  H  19  12:35    


 


Creatinine  1.1 mg/dL (0.7-1.3)   19  12:35    


 


Est GFR ( Amer)  > 60.0 ml/min (>90)   19  12:35    


 


Est GFR (Non-Af Amer)  > 60.0 ml/min  19  12:35    


 


BUN/Creatinine Ratio  24.5   19  12:35    


 


Glucose  96 mg/dL ()   19  12:35    


 


Calcium  9.7 mg/dL (8.6-10.3)   19  12:35    


 


Total Bilirubin  0.4 mg/dL (0.3-1.0)   19  12:35    


 


AST  16 U/L (13-39)   19  12:35    


 


ALT  13 U/L (7-52)   19  12:35    


 


Alkaline Phosphatase  38 U/L ()   19  12:35    


 


Total Protein  6.4 gm/dL (6.0-8.3)   19  12:35    


 


Albumin  3.6 gm/dL (4.2-5.5)  L  19  12:35    


 


Globulin  2.8 gm/dL  19  12:35    


 


Albumin/Globulin Ratio  1.3  (1.0-1.8)   19  12:35    


 


Triglycerides  86 mg/dL (<150)   19  12:35    


 


Cholesterol  144 mg/dL (<200)   19  12:35    


 


LDL Cholesterol Direct  81 mg/dL ()   19  12:35    


 


HDL Cholesterol  50 mg/dL (23-92)   19  12:35    


 


Lithium  1.18 mmol/L (0.5-1.0)  H  19  12:35    














- Physical Exam


Vitals and I&O: 


 Vital Signs











Temp  97.3 F   19 06:44


 


Pulse  74   19 06:44


 


Resp  20   19 06:44


 


BP  106/69   19 06:44


 


Pulse Ox  98   19 06:44








 Intake & Output











 19





 18:59 06:59 18:59


 


Intake Total 1600 120 


 


Balance 1600 120 


 


Weight (lbs) 55.338 kg 55.338 kg 


 


Intake:   


 


  Oral 1600 120 


 


Other:   


 


  # Voids 5 3 


 


  # Bowel Movements 1 2 


 


  Weight Source Estimated Estimated 











Active Medications: 


Current Medications





Acetaminophen (Tylenol)  650 mg PO Q6H PRN


   PRN Reason: Pain / Temp above 100


   Stop: 10/11/19 14:24


Al Hydrox/Mg Hydrox/Simethicone (Maalox)  30 ml PO Q4H PRN


   PRN Reason: GI DISTRESS


   Stop: 10/11/19 14:24


Ascorbic Acid (Vitamin C)  500 mg PO DAILY [facility]


   Stop: 10/12/19 08:59


   Last Admin: 19 08:27 Dose:  500 mg


Clonidine HCl (Catapres-Tts-1)  1 patch TD QTUE [facility]


   Stop: 10/12/19 08:59


   Last Admin: 19 09:57 Dose:  Not Given


Docusate Sodium (Colace)  100 mg PO DAILY [facility]


   Stop: 10/12/19 08:59


   Last Admin: 19 08:27 Dose:  100 mg


Ferrous Sulfate (Iron)  325 mg PO DAILY [facility]


   Stop: 10/12/19 08:59


   Last Admin: 19 08:27 Dose:  325 mg


Haloperidol Lactate (Haldol)  3 mg PO TID [facility]


   Stop: 10/13/19 20:59


   Last Admin: 19 08:27 Dose:  3 mg


Lorazepam (Ativan)  0.5 mg PO Q6H PRN; Protocol


   PRN Reason: Agitation


   Stop: 10/11/19 14:24


   Last Admin: 19 23:08 Dose:  0.5 mg


Magnesium Hydroxide (Milk Of Magnesia)  30 ml PO HS PRN


   PRN Reason: Constipation


   Stop: 10/11/19 14:24


Multivitamins/Vitamin C (Theragran)  1 tab PO DAILY [facility]


   Stop: 10/12/19 08:59


   Last Admin: 19 08:27 Dose:  1 tab


Trazodone HCl (Desyrel)  50 mg PO HS [facility]; Protocol


   Stop: 10/11/19 20:59


   Last Admin: 19 21:53 Dose:  50 mg








General: demented, thin, appears older


HEENT: NC/AT, PERRLA, EOMI


Neck: Supple, No JVD, No thyromegaly, No LAD


Lungs: CTAB


Cardiovascular: RRR, Normal S1, Normal S2


Abdomen: soft, non-tender, thin, non-distended, positive bowel sound


Extremities: excoriation, contracture





Internal Medicine Assmt/Plan





- Assessment


Assessment: 





ASSESSMENT AND PLAN:


1.  Hypertension.


2.  Dehydration.


3.  Renal insufficiency.


4.  Thin built.


5.  History of agitation and psych disorder.


6.  Anemia.


7.  Hyponatremia.


8.  Low albumin.











- Plan


Plan: 








PLAN:  Continue oxygen, bronchodilator treatments.  We will provide the patient


with adequate support with antihypertensive medication.  We will monitor


hemoglobin and hematocrit.  We will review meds  We will continue


monitoring the patient closely.


will refer to PT for eval





Nutritional Asmnt/Malnutr-PDOC





- Dietary Evaluation


Malnutrition Findings (Please click <Entered> for more info): 








Nutritional Asmnt/Malnutrition                             Start:  19 11:

29


Text:                                                      Status: Complete    

  


Freq:                                                                          

  


Protocol:                                                                      

  


 Document     19 11:29  JUSTINE  (Rec: 19 11:33  JUSTINE  [id number])


 Nutritional Asmnt/Malnutrition


     Patient General Information


      Nutritional Screening                      High Risk


      Diagnosis                                  Psychosis


      Pertinent Medical Hx/Surgical Hx           Psych Disorder, oral ulcer


                                                 disease, COPD


      Subjective Information                     Pt is a 59-year-old male


                                                 admitted on  d/t


                                                 aggressiveness. RNVincenzo,


                                                 stated Pt ate well this


                                                 morning, 70% meal and seemed


                                                 to be in a good mood. As Pt is


                                                 underweight and has refused


                                                 meals d/t psychosis in


                                                 previous hospital visits,


                                                 adding Ensure Enlive TID to


                                                 encourage weight gain trending


                                                 towards IBW.


                                                 HT: 510


                                                 WT: 110 LB (50 kg)


                                                 BMI: 15.79 (Underweight)


                                                 GI: Not noted


                                                 BM: Not Noted


                                                 I/O: Not Noted


                                                 Skin: Not noted


                                                 Clifford: 17


                                                 Diet Order: Cardiac, chopped


                                                 Estimated Energy Needs: (


                                                 Underweight, CBW)


                                                 2005-0375 kcals (30-35 kcals/


                                                 kg)


                                                 50-60g Pro (1.0-1.2 g/kg)


                                                 4764-1914 ml (35-40 ml/kg)


      Current Diet Order/ Nutrition Support      Cardiac, chopped


      Pertinent Medications                      Maalox (PRN), Vitamin C,


                                                 Colace, Ferrous Sulfate, MOM (


                                                 PRN), Theregran


      Pertinent Labs                             : Hgb/Hct 11.6/35.0, Na


                                                 132, BUN/Cr 27/1.1, Alb 3.6


     Nutritional Hx/Data


      Height                                     1.78 m


      Height (Calculated Centimeters)            177.8


      Current Weight (lbs)                       49.895 kg


      Weight (Calculated Kilograms)              49.9


      Weight (Calculated Grams)                  03185.2


      Ideal Body Weight                          166 LB (75.45kg)


      % Ideal Body Weight                        66


      Body Mass Index (BMI)                      15.7


      Weight Status                              Underweight


     GI Symptoms


      Last BM                                    Not noted


      Skin Integrity/Comment:                    Clifford: 17


      Current %PO                                Fair (50-74%)


     Estimated Nutritional Goals


      BEE in Kcals:                              Using Current wt


      Calories/Kcals/Kg                          30-35


      Kcals Calculated                           0372-9223


      Protein:                                   Using Current wt


      Protein g/k.0-1.2


      Protein Calculated                         50-60


      Fluid: ml                                  7177-5066 ml (35-40 ml/kg)


     Nutritional Problem


      1. Problem


       Problem                                   Underweight


       Etiology                                  r/t psychosis


       Signs/Symptoms:                           aeb Pt past behavior of


                                                 refusing meals for consecutive


                                                 days.


     Malnutrition Related to Morbid Obesity


      Malnutrition related to morbid obesity     No


     Intervention/Recommendation


      Comments                                   1. Continue with Cardiac,


                                                 chopped diet as ordered.


                                                 2. Add Ensure Enlive TID (


                                                 completed).


     Expected Outcomes/Goals


      Expected Outcomes/Goals                    1. Continue with Cardiac,


                                                 chopped diet as ordered.


                                                 2. Add Ensure Enlive TID (


                                                 completed).


                                                 3. PO intake to meet 75% of


                                                 nutritional needs.


                                                 4. Monitor PO intake, wt,


                                                 nutrition related labs, and


                                                 skin integrity.


                                                 5. F/U as moderate risk in 3-5


                                                 days, 8/15-

## 2019-08-26 NOTE — DISCHARGE SUMMARY
DATE OF DISCHARGE:  08/26/2019



HISTORY OF PRESENT ILLNESS:  A 59-year-old male, well known to this clinician,

coming from a skilled nursing, unruly, refusing labs, concerns for lithium

toxicity, agitated, telling me to go away, cursing, yelling.



PAST PSYCHIATRIC HISTORY:  Multiple admissions, long history of mental illness.



SOCIAL HISTORY:  Stays at a skilled nursing.



MEDICATIONS:  Noted.



PROVISIONAL DIAGNOSIS:  Schizophrenia, rule out schizoaffective.



MEDICAL:  Please see full H and P.



HOSPITAL COURSE:  After initial assessment, the patient was started on

medications, which were titrated and adjusted.  Haldol dosage increased.  Over

the course of treatment, he was calmer, more cooperative, still remained

confused, odd, telling me to go away and stating he is "going to leave" and

find his own way, totally out of touch with reality, perseverative, ruminative. 

However, toward the latter end of treatment, he was calm, not combative, likely

at his baseline.  Tolerant of treatment.  No EPS.  Staff noting improvement and

he was sent home back to the skilled nursing.



CONDITION UPON DISCHARGE:  Improved, calm, generally more cooperative, odd

ideations, confused, disoriented.  No SI, no HI, no overt psychosis.  Better

impulse control.



PROVISIONAL DIAGNOSIS:  Schizophrenia, rule out schizoaffective.



PAST MEDICAL HISTORY:  Please see full H and P.



PROGNOSIS:  The patient follows up with outpatient mental health services and

remains compliant with treatment.  Prognosis will improve, otherwise guarded.





DD: 08/26/2019 16:26

DT: 08/26/2019 16:51

[facility]# [id number]  [id number]

## 2021-09-29 NOTE — INTERNAL MEDICINE PROG NOTE
Internal Medicine Subjective





- Subjective


Patient seen and examined:: with staff, chart reviewed


Patient is:: awake, verbal, interactive, confused


Per staff patient has:: no adverse event, poor appetite





Internal Medicine Objective





- Results


Recent Labs: 


 Laboratory Last Values











Triglycerides  53 mg/dL (<150)   19  06:10    


 


Cholesterol  115 mg/dL (<200)   19  06:10    


 


LDL Cholesterol Direct  61 mg/dL ()  L  19  06:10    


 


HDL Cholesterol  36 mg/dL (23-92)   19  06:10    














- Physical Exam


Vitals and I&O: 


 Vital Signs











Temp  97.0 F   19 15:00


 


Pulse  98   19 15:00


 


Resp  20   19 20:00


 


BP  118/74   19 15:00


 


Pulse Ox  96   19 15:00








 Intake & Output











 19





 06:59 18:59 06:59


 


Intake Total 560 950 


 


Output Total 2  


 


Balance 558 950 


 


Intake:   


 


  Oral 560 950 


 


Output:   


 


  Urine 2  


 


Other:   


 


  # Voids 3 4 


 


  # Bowel Movements 1 1 











Active Medications: 


Current Medications





Acetaminophen (Tylenol)  650 mg PO Q4HR PRN


   PRN Reason: Mild Pain / Temp above 100


   Stop: 19 00:31


Al Hydrox/Mg Hydrox/Simethicone (Maalox)  30 ml PO Q4HR PRN


   PRN Reason: GI DISTRESS


   Stop: 19 00:31


Ascorbic Acid (Vitamin C)  500 mg PO DAILY [facility]


   Stop: 19 08:59


   Last Admin: 19 09:23 Dose:  500 mg


Haloperidol Lactate (Haldol)  3 mg PO BID [facility]; Protocol


   Stop: 19 16:59


   Last Admin: 19 17:05 Dose:  3 mg


Lorazepam (Ativan)  0.5 mg PO Q4HR PRN; Protocol


   PRN Reason: Anxiety


   Stop: 19 00:49


   Last Admin: 19 16:59 Dose:  0.5 mg


Magnesium Hydroxide (Milk Of Magnesia)  30 ml PO HS PRN


   PRN Reason: Constipation


Multivitamins/Vitamin C (Theragran)  1 tab PO DAILY [facility]


   Stop: 19 08:59


   Last Admin: 19 09:23 Dose:  1 tab


Zolpidem Tartrate (Ambien)  5 mg PO HS PRN


   PRN Reason: Insomnia


   Stop: 19 00:49


   Last Admin: 19 01:00 Dose:  5 mg








General: demented


HEENT: NC/AT, PERRLA, EOMI


Neck: Supple, No JVD


Lungs: CTAB


Cardiovascular: RRR, Normal S1, Normal S2


Abdomen: soft, non-tender, positive bowel sound


Extremities: excoriation


Neurological: no change





Internal Medicine Assmt/Plan





- Assessment


Assessment: 





ASSESSMENT AND PLAN:  Dehydration, malnutrition, anemia, renal insufficiency. 


generalized weakness








- Plan


Plan: 





PLAN:  


We will provide the patient adequate nutritional support.  


will provide hydration, supplements, sypmtomatic care and treatment.  


We will continue to follow.








Nutritional Asmnt/Malnutr-PDOC





- Dietary Evaluation


Malnutrition Findings (Please click <Entered> for more info): 








Nutritional Asmnt/Malnutrition                             Start:  19 14:

01


Text:                                                      Status: Complete    

  


Freq:                                                                          

  


Protocol:                                                                      

  


 Document     19 14:02  GABRIEL  (Rec: 19 14:02  [facility]  [id number])


 Nutritional Asmnt/Malnutrition


     Patient General Information


      Nutritional Screening                      High Risk


                                                 Consult


      Diagnosis                                  psychosis


      Pertinent Medical Hx/Surgical Hx           no H&P as of this time


      Subjective Information                     Consult received for poor


                                                 appetite. But SHRUTHI Rahman


                                                 reported pt ate well, consumed


                                                 75% breakfast this morning.


                                                 Pt requested strawberry Ensure


                                                 .


      Current Diet Order/ Nutrition Support      regular


      Pertinent Medications                      vit C, theragran


      Pertinent Labs                              reviewed


     Nutritional Hx/Data


      Height                                     1.78 m


      Height (Calculated Centimeters)            177.8


      Current Weight (lbs)                       54.431 kg


      Weight (Calculated Kilograms)              54.4


      Weight (Calculated Grams)                  88700.1


      Ideal Body Weight                          166


      Body Mass Index (BMI)                      17.2


      Weight Status                              Underweight


     GI Symptoms


      GI Symptoms                                None


      Last BM                                    not indicated


      Difficult in:                              None


      Skin Integrity/Comment:                    pressure area to reddened


                                                 sacrume, abrasion to lower


                                                 left extremity


      Current %PO                                Good (%)


     Estimated Nutritional Goals


      BEE in Kcals:                              Using Current wt


      Calories/Kcals/Kg                          27-32


      Kcals Calculated                           7215-2620


      Protein:                                   Using Current wt


      Protein g/k-1.2


      Protein Calculated                         55-66


      Fluid: ml                                  1485-1760ml (1ml/kcal)


     Nutritional Problem


      1. Problem


       Problem                                   underweight


       Etiology                                  possible inadequate energy


                                                 intake


       Signs/Symptoms:                           BMI 17.2


     Intervention/Recommendation


      Comments                                   1. Continue with regular diet


                                                 as ordered. Add Ensure BID (


                                                 strawberry flavor).


                                                 2. Monitor PO intake, wt, labs


                                                 and skin integrity


                                                 3. F/U as low risk in 7 days


     Expected Outcomes/Goals


      Expected Outcomes/Goals                    1. PO intake to meet at least


                                                 75% of nutritional needs.


                                                 2. Wt stability, skin to


                                                 remain intact, labs to


                                                 approach WNL. Never smoker